# Patient Record
Sex: FEMALE | Race: BLACK OR AFRICAN AMERICAN | Employment: OTHER | URBAN - METROPOLITAN AREA
[De-identification: names, ages, dates, MRNs, and addresses within clinical notes are randomized per-mention and may not be internally consistent; named-entity substitution may affect disease eponyms.]

---

## 2023-06-07 ENCOUNTER — HOSPITAL ENCOUNTER (INPATIENT)
Age: 73
LOS: 1 days | Discharge: HOME | End: 2023-06-08
Attending: EMERGENCY MEDICINE | Admitting: INTERNAL MEDICINE
Payer: MEDICARE

## 2023-06-07 ENCOUNTER — APPOINTMENT (OUTPATIENT)
Dept: CT IMAGING | Age: 73
End: 2023-06-07
Payer: MEDICARE

## 2023-06-07 DIAGNOSIS — R55 SYNCOPE AND COLLAPSE: Primary | ICD-10-CM

## 2023-06-07 LAB
ANION GAP SERPL CALCULATED.3IONS-SCNC: 12 MMOL/L (ref 3–16)
BASOPHILS # BLD: 0 K/UL (ref 0–0.2)
BASOPHILS NFR BLD: 0.5 %
BUN SERPL-MCNC: 16 MG/DL (ref 7–20)
CALCIUM SERPL-MCNC: 9.3 MG/DL (ref 8.3–10.6)
CHLORIDE SERPL-SCNC: 104 MMOL/L (ref 99–110)
CO2 SERPL-SCNC: 23 MMOL/L (ref 21–32)
CREAT SERPL-MCNC: 1 MG/DL (ref 0.6–1.2)
DEPRECATED RDW RBC AUTO: 13.5 % (ref 12.4–15.4)
EOSINOPHIL # BLD: 0.3 K/UL (ref 0–0.6)
EOSINOPHIL NFR BLD: 3.6 %
GFR SERPLBLD CREATININE-BSD FMLA CKD-EPI: 59 ML/MIN/{1.73_M2}
GLUCOSE BLD-MCNC: 164 MG/DL (ref 70–99)
GLUCOSE SERPL-MCNC: 132 MG/DL (ref 70–99)
HCT VFR BLD AUTO: 36.5 % (ref 36–48)
HGB BLD-MCNC: 12.5 G/DL (ref 12–16)
LYMPHOCYTES # BLD: 3.6 K/UL (ref 1–5.1)
LYMPHOCYTES NFR BLD: 41.9 %
MCH RBC QN AUTO: 32.1 PG (ref 26–34)
MCHC RBC AUTO-ENTMCNC: 34.3 G/DL (ref 31–36)
MCV RBC AUTO: 93.7 FL (ref 80–100)
MONOCYTES # BLD: 0.5 K/UL (ref 0–1.3)
MONOCYTES NFR BLD: 6.3 %
NEUTROPHILS # BLD: 4.1 K/UL (ref 1.7–7.7)
NEUTROPHILS NFR BLD: 47.7 %
PERFORMED ON: ABNORMAL
PLATELET # BLD AUTO: 243 K/UL (ref 135–450)
PMV BLD AUTO: 8.7 FL (ref 5–10.5)
POTASSIUM SERPL-SCNC: 3.5 MMOL/L (ref 3.5–5.1)
RBC # BLD AUTO: 3.9 M/UL (ref 4–5.2)
SODIUM SERPL-SCNC: 139 MMOL/L (ref 136–145)
TROPONIN, HIGH SENSITIVITY: 9 NG/L (ref 0–14)
WBC # BLD AUTO: 8.6 K/UL (ref 4–11)

## 2023-06-07 PROCEDURE — 84484 ASSAY OF TROPONIN QUANT: CPT

## 2023-06-07 PROCEDURE — 99285 EMERGENCY DEPT VISIT HI MDM: CPT

## 2023-06-07 PROCEDURE — 70450 CT HEAD/BRAIN W/O DYE: CPT

## 2023-06-07 PROCEDURE — 93005 ELECTROCARDIOGRAM TRACING: CPT | Performed by: EMERGENCY MEDICINE

## 2023-06-07 PROCEDURE — 85025 COMPLETE CBC W/AUTO DIFF WBC: CPT

## 2023-06-07 PROCEDURE — 80048 BASIC METABOLIC PNL TOTAL CA: CPT

## 2023-06-07 RX ORDER — NICOTINE 21 MG/24HR
1 PATCH, TRANSDERMAL 24 HOURS TRANSDERMAL
COMMUNITY
Start: 2023-05-30

## 2023-06-07 RX ORDER — ASPIRIN 81 MG/1
81 TABLET ORAL DAILY
COMMUNITY
Start: 2015-10-13

## 2023-06-07 RX ORDER — ACETAMINOPHEN 325 MG/1
975 TABLET ORAL EVERY 8 HOURS PRN
COMMUNITY
Start: 2023-05-30

## 2023-06-07 RX ORDER — AMLODIPINE BESYLATE 10 MG/1
10 TABLET ORAL DAILY
COMMUNITY
Start: 2022-09-19

## 2023-06-07 RX ORDER — SIMVASTATIN 20 MG
20 TABLET ORAL NIGHTLY
COMMUNITY
Start: 2015-12-17

## 2023-06-07 RX ORDER — LISINOPRIL 10 MG/1
10 TABLET ORAL DAILY
COMMUNITY
Start: 2023-05-30 | End: 2023-06-07

## 2023-06-07 RX ORDER — SERTRALINE HYDROCHLORIDE 100 MG/1
TABLET, FILM COATED ORAL
COMMUNITY
Start: 2023-06-05

## 2023-06-07 RX ORDER — LOSARTAN POTASSIUM 50 MG/1
1 TABLET ORAL DAILY
Status: ON HOLD | COMMUNITY
Start: 2015-11-30 | End: 2023-06-08

## 2023-06-07 RX ORDER — QUETIAPINE FUMARATE 25 MG/1
25 TABLET, FILM COATED ORAL NIGHTLY
COMMUNITY
Start: 2023-06-05

## 2023-06-07 RX ORDER — LANOLIN ALCOHOL/MO/W.PET/CERES
6 CREAM (GRAM) TOPICAL NIGHTLY
COMMUNITY
Start: 2023-05-30

## 2023-06-07 RX ORDER — LEVETIRACETAM 500 MG/1
500 TABLET ORAL 2 TIMES DAILY
Status: ON HOLD | COMMUNITY
Start: 2022-09-19 | End: 2023-06-08 | Stop reason: HOSPADM

## 2023-06-07 ASSESSMENT — LIFESTYLE VARIABLES
HOW OFTEN DO YOU HAVE A DRINK CONTAINING ALCOHOL: NEVER
HOW MANY STANDARD DRINKS CONTAINING ALCOHOL DO YOU HAVE ON A TYPICAL DAY: PATIENT DOES NOT DRINK

## 2023-06-07 ASSESSMENT — PAIN SCALES - GENERAL: PAINLEVEL_OUTOF10: 0

## 2023-06-07 ASSESSMENT — PAIN - FUNCTIONAL ASSESSMENT: PAIN_FUNCTIONAL_ASSESSMENT: 0-10

## 2023-06-08 VITALS
HEIGHT: 67 IN | OXYGEN SATURATION: 93 % | SYSTOLIC BLOOD PRESSURE: 138 MMHG | HEART RATE: 56 BPM | WEIGHT: 152.5 LBS | BODY MASS INDEX: 23.93 KG/M2 | TEMPERATURE: 97.9 F | DIASTOLIC BLOOD PRESSURE: 63 MMHG | RESPIRATION RATE: 20 BRPM

## 2023-06-08 PROBLEM — R55 SYMPTOM OF SYNCOPE: Status: ACTIVE | Noted: 2023-06-08

## 2023-06-08 LAB
BILIRUB UR QL STRIP.AUTO: NEGATIVE
CLARITY UR: CLEAR
COLOR UR: YELLOW
EKG ATRIAL RATE: 58 BPM
EKG DIAGNOSIS: NORMAL
EKG P AXIS: 65 DEGREES
EKG P-R INTERVAL: 154 MS
EKG Q-T INTERVAL: 428 MS
EKG QRS DURATION: 80 MS
EKG QTC CALCULATION (BAZETT): 420 MS
EKG R AXIS: -3 DEGREES
EKG T AXIS: 79 DEGREES
EKG VENTRICULAR RATE: 58 BPM
GLUCOSE UR STRIP.AUTO-MCNC: NEGATIVE MG/DL
HGB UR QL STRIP.AUTO: NEGATIVE
KETONES UR STRIP.AUTO-MCNC: ABNORMAL MG/DL
LEUKOCYTE ESTERASE UR QL STRIP.AUTO: NEGATIVE
NITRITE UR QL STRIP.AUTO: NEGATIVE
PH UR STRIP.AUTO: 5.5 [PH] (ref 5–8)
PROT UR STRIP.AUTO-MCNC: NEGATIVE MG/DL
SP GR UR STRIP.AUTO: 1.02 (ref 1–1.03)
TROPONIN, HIGH SENSITIVITY: 10 NG/L (ref 0–14)
UA DIPSTICK W REFLEX MICRO PNL UR: ABNORMAL
URN SPEC COLLECT METH UR: ABNORMAL
UROBILINOGEN UR STRIP-ACNC: 2 E.U./DL

## 2023-06-08 PROCEDURE — 97161 PT EVAL LOW COMPLEX 20 MIN: CPT

## 2023-06-08 PROCEDURE — 81003 URINALYSIS AUTO W/O SCOPE: CPT

## 2023-06-08 PROCEDURE — 97530 THERAPEUTIC ACTIVITIES: CPT

## 2023-06-08 PROCEDURE — APPNB60 APP NON BILLABLE TIME 46-60 MINS: Performed by: NURSE PRACTITIONER

## 2023-06-08 PROCEDURE — 1200000000 HC SEMI PRIVATE

## 2023-06-08 PROCEDURE — 97165 OT EVAL LOW COMPLEX 30 MIN: CPT

## 2023-06-08 PROCEDURE — 2580000003 HC RX 258: Performed by: STUDENT IN AN ORGANIZED HEALTH CARE EDUCATION/TRAINING PROGRAM

## 2023-06-08 PROCEDURE — 97535 SELF CARE MNGMENT TRAINING: CPT

## 2023-06-08 PROCEDURE — 6370000000 HC RX 637 (ALT 250 FOR IP): Performed by: STUDENT IN AN ORGANIZED HEALTH CARE EDUCATION/TRAINING PROGRAM

## 2023-06-08 PROCEDURE — 97116 GAIT TRAINING THERAPY: CPT

## 2023-06-08 RX ORDER — LEVETIRACETAM 1000 MG/1
1000 TABLET ORAL 2 TIMES DAILY
Qty: 60 TABLET | Refills: 3 | Status: SHIPPED | OUTPATIENT
Start: 2023-06-08 | End: 2023-06-08 | Stop reason: SDUPTHER

## 2023-06-08 RX ORDER — ACETAMINOPHEN 650 MG/1
650 SUPPOSITORY RECTAL EVERY 6 HOURS PRN
Status: DISCONTINUED | OUTPATIENT
Start: 2023-06-08 | End: 2023-06-08 | Stop reason: HOSPADM

## 2023-06-08 RX ORDER — ENOXAPARIN SODIUM 100 MG/ML
40 INJECTION SUBCUTANEOUS DAILY
Status: DISCONTINUED | OUTPATIENT
Start: 2023-06-08 | End: 2023-06-08 | Stop reason: HOSPADM

## 2023-06-08 RX ORDER — NICOTINE 21 MG/24HR
1 PATCH, TRANSDERMAL 24 HOURS TRANSDERMAL DAILY
Status: DISCONTINUED | OUTPATIENT
Start: 2023-06-08 | End: 2023-06-08 | Stop reason: HOSPADM

## 2023-06-08 RX ORDER — LOSARTAN POTASSIUM 50 MG/1
50 TABLET ORAL DAILY
Status: DISCONTINUED | OUTPATIENT
Start: 2023-06-08 | End: 2023-06-08 | Stop reason: HOSPADM

## 2023-06-08 RX ORDER — LEVETIRACETAM 500 MG/1
1000 TABLET ORAL 2 TIMES DAILY
Status: DISCONTINUED | OUTPATIENT
Start: 2023-06-08 | End: 2023-06-08 | Stop reason: HOSPADM

## 2023-06-08 RX ORDER — ASPIRIN 81 MG/1
81 TABLET ORAL DAILY
Status: DISCONTINUED | OUTPATIENT
Start: 2023-06-08 | End: 2023-06-08 | Stop reason: HOSPADM

## 2023-06-08 RX ORDER — ONDANSETRON 4 MG/1
4 TABLET, ORALLY DISINTEGRATING ORAL EVERY 8 HOURS PRN
Status: DISCONTINUED | OUTPATIENT
Start: 2023-06-08 | End: 2023-06-08 | Stop reason: HOSPADM

## 2023-06-08 RX ORDER — LEVETIRACETAM 500 MG/1
500 TABLET ORAL 2 TIMES DAILY
Status: DISCONTINUED | OUTPATIENT
Start: 2023-06-08 | End: 2023-06-08

## 2023-06-08 RX ORDER — AMLODIPINE BESYLATE 10 MG/1
10 TABLET ORAL DAILY
Status: DISCONTINUED | OUTPATIENT
Start: 2023-06-08 | End: 2023-06-08 | Stop reason: HOSPADM

## 2023-06-08 RX ORDER — LANOLIN ALCOHOL/MO/W.PET/CERES
500 CREAM (GRAM) TOPICAL DAILY
Status: DISCONTINUED | OUTPATIENT
Start: 2023-06-08 | End: 2023-06-08 | Stop reason: HOSPADM

## 2023-06-08 RX ORDER — LISINOPRIL 10 MG/1
10 TABLET ORAL DAILY
COMMUNITY

## 2023-06-08 RX ORDER — SODIUM CHLORIDE 0.9 % (FLUSH) 0.9 %
5-40 SYRINGE (ML) INJECTION EVERY 12 HOURS SCHEDULED
Status: DISCONTINUED | OUTPATIENT
Start: 2023-06-08 | End: 2023-06-08 | Stop reason: HOSPADM

## 2023-06-08 RX ORDER — ACETAMINOPHEN 325 MG/1
650 TABLET ORAL EVERY 6 HOURS PRN
Status: DISCONTINUED | OUTPATIENT
Start: 2023-06-08 | End: 2023-06-08 | Stop reason: HOSPADM

## 2023-06-08 RX ORDER — ONDANSETRON 2 MG/ML
4 INJECTION INTRAMUSCULAR; INTRAVENOUS EVERY 6 HOURS PRN
Status: DISCONTINUED | OUTPATIENT
Start: 2023-06-08 | End: 2023-06-08 | Stop reason: HOSPADM

## 2023-06-08 RX ORDER — SODIUM CHLORIDE 9 MG/ML
INJECTION, SOLUTION INTRAVENOUS PRN
Status: DISCONTINUED | OUTPATIENT
Start: 2023-06-08 | End: 2023-06-08 | Stop reason: HOSPADM

## 2023-06-08 RX ORDER — ATORVASTATIN CALCIUM 10 MG/1
10 TABLET, FILM COATED ORAL DAILY
Status: DISCONTINUED | OUTPATIENT
Start: 2023-06-08 | End: 2023-06-08 | Stop reason: HOSPADM

## 2023-06-08 RX ORDER — POLYETHYLENE GLYCOL 3350 17 G/17G
17 POWDER, FOR SOLUTION ORAL DAILY PRN
Status: DISCONTINUED | OUTPATIENT
Start: 2023-06-08 | End: 2023-06-08 | Stop reason: HOSPADM

## 2023-06-08 RX ORDER — SODIUM CHLORIDE 0.9 % (FLUSH) 0.9 %
5-40 SYRINGE (ML) INJECTION PRN
Status: DISCONTINUED | OUTPATIENT
Start: 2023-06-08 | End: 2023-06-08 | Stop reason: HOSPADM

## 2023-06-08 RX ORDER — LEVETIRACETAM 1000 MG/1
1000 TABLET ORAL 2 TIMES DAILY
Qty: 60 TABLET | Refills: 3 | Status: SHIPPED | OUTPATIENT
Start: 2023-06-08

## 2023-06-08 RX ADMIN — ASPIRIN 81 MG: 81 TABLET, COATED ORAL at 09:56

## 2023-06-08 RX ADMIN — ATORVASTATIN CALCIUM 10 MG: 10 TABLET, FILM COATED ORAL at 09:56

## 2023-06-08 RX ADMIN — CYANOCOBALAMIN TAB 1000 MCG 500 MCG: 1000 TAB at 09:56

## 2023-06-08 RX ADMIN — SODIUM CHLORIDE, PRESERVATIVE FREE 10 ML: 5 INJECTION INTRAVENOUS at 09:56

## 2023-06-08 RX ADMIN — LEVETIRACETAM 500 MG: 500 TABLET, FILM COATED ORAL at 09:55

## 2023-06-08 NOTE — DISCHARGE SUMMARY
Conjunctiva/sclera: Conjunctivae normal.      Pupils: Pupils are equal, round, and reactive to light. Cardiovascular:      Rate and Rhythm: Normal rate and regular rhythm. Pulses: Normal pulses. Heart sounds: Normal heart sounds. Pulmonary:      Effort: Pulmonary effort is normal.      Breath sounds: Normal breath sounds. Abdominal:      General: Bowel sounds are normal.   Musculoskeletal:         General: Normal range of motion. Cervical back: Normal range of motion. Skin:     General: Skin is warm. Neurological:      General: No focal deficit present. Mental Status: She is alert. Comments: AOX1   Psychiatric:         Mood and Affect: Mood normal.         Behavior: Behavior normal.     Labs: For convenience and continuity at follow-up the following most recent labs are provided:      CBC:    Lab Results   Component Value Date/Time    WBC 8.6 06/07/2023 10:36 PM    HGB 12.5 06/07/2023 10:36 PM    HCT 36.5 06/07/2023 10:36 PM     06/07/2023 10:36 PM       Renal:    Lab Results   Component Value Date/Time     06/07/2023 10:36 PM    K 3.5 06/07/2023 10:36 PM     06/07/2023 10:36 PM    CO2 23 06/07/2023 10:36 PM    BUN 16 06/07/2023 10:36 PM    CREATININE 1.0 06/07/2023 10:36 PM    CALCIUM 9.3 06/07/2023 10:36 PM       Significant Diagnostic Studies    Radiology:   CT HEAD WO CONTRAST   Final Result   Impression:   1. No evidence of recent intracranial hemorrhage. 2. Parenchymal volume loss and chronic small vessel ischemic changes in the white matter. 3. Remote lacunar infarcts as above.            Consults:     IP CONSULT TO PHARMACY  IP CONSULT TO SOCIAL WORK  IP CONSULT TO NEUROLOGY  IP CONSULT TO HOME CARE NEEDS    Disposition:  Home yandel Cavazos     Condition at Discharge: Stable    Discharge Instructions/Follow-up:  PCP, neurology    Code Status:  Full Code     Activity: activity as tolerated    Diet: regular diet      Discharge Medications:     Current Discharge

## 2023-06-08 NOTE — H&P
History & Physical      CC syncopal episode    History Obtained From:  family member -patient's daughter    HISTORY OF PRESENT ILLNESS:  68-year-old female with past medical history of advanced dementia, hypertension, seizure disorder (on AED) presented to the ED following a syncopal episode. At baseline patient is AO x1, oriented to self. Per patient's daughter, patient had just had dinner and was sitting on the chair when she dozed off and fell down. Patient hit her head on the floor which woke her up. This was a witnessed fall, no seizure activity/generalized shaking, tongue biting, eye rolling was noted. Before dozing off, patient had told the daughter that she was feeling sleepy and she will take a nap on the chair, unfortunately the chair tipped over and patient hit her head. Patient's daughter reports patient had just taken her Keppra medication 30 minutes prior to this following episode. She is compliant with all her medications. Denies any prior chest pain, shortness of breath, nausea/vomiting, lightheadedness or dizziness. EMS was called who checked in on the patient reported she was fine but patient's daughter wanted to get patient's head scan and requested them to take her to the hospital for the same. Patient was hemodynamically stable. In the ED, afebrile 98 0.1 F, RR 16, pulse 58, /69, SPO2 91% on room air. Labs not significant. CT head Noncon nonrevealing of any recent intracranial hemorrhage. Parenchymal volume loss and chronic small vessel ischemic changes in white matter. Remote lacunar infarct. Patient was admitted for further work-up of syncope. Past Medical History:        Diagnosis Date    Depression     Hypertension        Past Surgical History:    History reviewed. No pertinent surgical history.     Medications Priorto Admission:    Medications Prior to Admission: simvastatin (ZOCOR) 20 MG tablet, Take 1 tablet by mouth nightly  QUEtiapine (SEROQUEL) 25 MG

## 2023-06-08 NOTE — CARE COORDINATION
CTN contacted [facility] [phone]. They have accepted this patient and will pull referral from [facility].  They will contact patient and make arrangements for [facility] by 6/10  Electronically signed by Ray Barrios LPN on 7/8/6243 at 2:76 PM
/24      DISCHARGE Disposition: Home with Home Health Care: SN PTOT      LOC at discharge: Skilled  NICK Completed: Not Indicated    Notification completed in HENS/PAS?:  Not Applicable    IMM Completed:   No         Transportation:  Transportation PLAN for discharge: family   Mode of Transport: Private Car  Reason for medical transport: Not Applicable  Name of [address]: Not Applicable  Time of Transport: when  dg  Tanmay Morgan here    Transport form completed: No    Home Care:  [address] ordered at discharge: Yes  [address]:     [facility] [address] [place] [address] online care: [facility]. [facility]   [address], [address]  Open ? Closes 5? PM  Phone: [phone]    Orders faxed: Yes    Durable Medical Equipment:  DME Provider: NA  Equipment obtained during hospitalization: NA    Home Oxygen and Respiratory Equipment:  Oxygen needed at discharge?: No  [address]: Not Applicable  Portable tank available for discharge?: No    Dialysis:  Dialysis patient: No    Dialysis Center:  Not Applicable    Hospice Services:  Location: Not Applicable  Agency: Not Applicable    Consents signed: Not Indicated    Referrals made at [facility] for outpatient continued care:  Not Applicable    Additional CM Notes:     CM  confirmed  d/c home today      Patient  lives  with Dg  [employer]  who  will transport. Patient to follow p out pt  as  instructed:     Agreeable to  [facility]  and  stated no preference or  choice. [facility]  accepted  . New Rx:  Meds to Beds  utilized      these medications from [facility] [address] [facility], [address] [place] [address]  Vincenzo Gutierrez [phone] Jennifer Lion [phone]  levETIRAcetam      Schedule an appointment with Sonya Lovell - Neurology as soon as possible for a visit in 1 month(s)  [address]   [phone]      COVID Result:  No results found for: COVID19    The Plan for Transition of Care is
cont to  follow . The Plan for Transition of Care is related to the following treatment goals of Syncope and collapse [R55]  Symptom of syncope [S88]    IF APPLICABLE: The Patient and/or patient representative Digna and her family were provided with a choice of provider and agrees with the discharge plan. Freedom of choice list with basic dialogue that supports the patient's individualized plan of care/goals and shares the quality data associated with the providers was provided to: Patient Representative   Patient Representative Name: anna Brock     The Patient and/or Patient Representative Agree with the Discharge Plan?  Yes    Marly Washburn RN  Case Management Department  Ph: [phone]

## 2023-06-08 NOTE — PLAN OF CARE
Problem: Safety - Adult  Goal: Free from fall injury  Outcome: Progressing:  Patient remains free from falls. Problem: Pain  Goal: Verbalizes/displays adequate comfort level or baseline comfort level  Outcome: Progressing:  Patient denies pain/needs at this time. Resting in bed, eyes closed. Patient refused all medications/care upon admit. Bed in low position, call light in reach, bed alarm and camera monitoring on for patient for safety, will continue to monitor.

## 2023-06-08 NOTE — PLAN OF CARE
Problem: Discharge Planning  Goal: Discharge to home or other facility with appropriate resources  Outcome: Progressing     Problem: Pain  Goal: Verbalizes/displays adequate comfort level or baseline comfort level  6/8/2023 1406 by Pradip Brock RN  Outcome: Progressing     Problem: Safety - Medical Restraint  Goal: Remains free of injury from restraints (Restraint for Interference with Medical Device)  Description: INTERVENTIONS:  1. Determine that other, less restrictive measures have been tried or would not be effective before applying the restraint  2. Evaluate the patient's condition at the time of restraint application  3. Inform patient/family regarding the reason for restraint  4.  Q2H: Monitor safety, psychosocial status, comfort, nutrition and hydration  Outcome: Adequate for Discharge     Problem: Safety - Adult  Goal: Free from fall injury  6/8/2023 1406 by Pradip Brock RN  Outcome: Adequate for Discharge

## 2023-06-08 NOTE — PROGRESS NOTES
4 Eyes in 4 Hours:  Unable to perform, due to patient's refusal to receive care. Telemetry Monitoring:  Patient is refusing to have telemetry monitoring.
Occupational Therapy  Facility/Department: [facility] 6 [address] [facility] Initial Assessment /Treatment/Discharge     Name: Marco A Flores  : 1950  MRN: [id number]  Date of Service: 2023    Discharge Recommendations:   Marco A Flores scored a 21/24 on the AM-PAC ADL Inpatient form. Current research shows that an AM-PAC score of 18 or greater is typically associated with a discharge to the patient's home setting. Please see assessment section for further patient specific details. OT Equipment Recommendations  Equipment Needed: No       Patient Diagnosis(es): The encounter diagnosis was Syncope and collapse. Past Medical History:  has a past medical history of Depression and Hypertension. Past Surgical History:  has no past surgical history on file. Assessment   Assessment: Pt presents from home after a fall from a chair. Pt is pleasant and cooperative. Pt req SBA to supervison for ADL and functional mobility. Pt is likely at her baseline. No acute OT needs identified. Prognosis: Good  Decision Making: Low Complexity  REQUIRES OT FOLLOW-UP: No  Activity Tolerance  Activity Tolerance: Patient Tolerated treatment well        Plan   Occupational Therapy Plan  Additional Comments: Discharge pt from acute OT     Restrictions  Position Activity Restriction  Other position/activity restrictions: Up with assist, up as tolerated. Seizure precautions. Subjective   General  Chart Reviewed: Yes  Additional Pertinent Hx: Pt admitted 23 for syncopal episode. Per patient's daughter, patient had just had dinner and was sitting on the chair when she dozed off and fell down. Patient hit her head on the floor which woke her up. CT head= No evidence of recent intracranial hemorrhage. 2.Parenchymal volume loss and chronic small vessel ischemic changes in the white matter. 3.Remote lacunar infarcts as above.   Family / Caregiver Present: No  Referring Practitioner: 
Patient arrived to [address] [place] [address] from the ER and she was immediately uncooperative with care stating she wanted to get in her [facility] and go home (asking staff where her car was). She refused lab work when phlebotomy came to see her. Physician, Dr. Merna Caruso, aware of patient's inability to cooperate with care as she came to see patient, along with Charge RN, Florinda Prado during her arrival to the floor. While helping patient to bed and applying a warm blanket for comfort, she hit me with her hand. Notified Florinda Prado, Charge RN at the , along with [facility], Daria Oconnor, who stated she saw patient hit me on the camera as she was watching the camera and monitor. Presently, patient is resting in bed. Bed in low position, call light in reach, bed alarm and camera on for patient's safety, will continue to monitor.
Per nurse report patient family informed that she had a seizure when she fell. I give a call to the patient daughter Orville Cristina. Rashmi Ellis described that the patient was in her chair sitting when she fall asleep and fell backwards. Patient hit her head with the floor. Right after the patient hit her head patient family who was there reported that the patient started having tonic-clonic movements and she was not responsive for less than 1 minute. Patient family is not sure if the patient had loss of sphincter control. Patient family denies tongue biting. They report that when the tonic movements ended patient was back to baseline, alert and oriented to herself and able to have a conversation. Patient family expresses concerns of seizure episode. With this new information we will place a consult for neurology to see the patient.      Electronically signed by Radha Gibbs MD on 6/8/23 at 11:59 AM EDT
Physical Therapy  Facility/Department: [address] [place] [address]  Physical Therapy Initial Assessment    Name: Maximus Polo  : 1950  MRN: [id number]  Date of Service: 2023    Discharge Recommendations: Maximus Polo scored a 19/24 on the AM-PAC short mobility form. Current research shows that an AM-PAC score of 18 or greater is typically associated with a discharge to the patient's home setting. Please see assessment section for further patient specific details. If patient discharges prior to next session this note will serve as a discharge summary. Please see below for the latest assessment towards goals. PT Equipment Recommendations  Equipment Needed: No      Patient Diagnosis(es): The encounter diagnosis was Syncope and collapse. Past Medical History:  has a past medical history of Depression and Hypertension. Past Surgical History:  has no past surgical history on file. Assessment   Body Structures, Functions, Activity Limitations Requiring Skilled Therapeutic Intervention: Decreased functional mobility ; Decreased safe awareness;Decreased balance;Decreased cognition  Assessment: pt is a 75 yo female from home with family reportedly IND at baseline however has hx of dementia and is a poor historian. pt pleasantly confused on eval. pt presents close to baseline function physically performing bed mobility, transfers, and ambulation with no AD and SBA. pt with good endurance and balance. pt expressing no decline from her baseline and plans to return home at [place]. pt would benefit from 24hr 2/2 cognitive deficits.  PT to f/u  Treatment Diagnosis: impaired functional mobility 2/2 cognition  Therapy Prognosis: Good  Decision Making: Low Complexity  Requires PT Follow-Up: Yes  Activity Tolerance  Activity Tolerance: Patient tolerated evaluation without incident     Plan   Physcial Therapy Plan  General Plan:  (2-5)  Current Treatment Recommendations: Strengthening, Balance training, Functional
Pt discharging home with daughter. Pt's Iv removed earlier and pt not on telemetry. Pt leaving with new prescription of keppra. Pt left with all personal belongings.
and during discharge there was a disagreement between the daughters at that time as to who will take mother home, and case was escalated with social work. - Consult to help determine appropriate discharge planning.       Code Status: Full code  FEN: Regular  PPX: Lovenox  DISPO: Robyn Soulier MS4  Miguel Michelle MD, PGY-1    This patient has been staffed and discussed with Duy Segovia MD.

## 2023-06-08 NOTE — ED NOTES
Score: 1  Level of Consciousness: Alert (0)   Vitals:    06/07/23 2210 06/08/23 0032   BP: 118/69 121/71   Pulse: 58 64   Resp: 16 23   Temp: 98.1 °F (36.7 °C)    TempSrc: Oral    SpO2: 91% 91%   Weight: 152 lb 8 oz (69.2 kg)    Height: 5' 6.5\" (1.689 m)      FiO2 (%):   O2 Flow Rate: O2 Device: None (Room air)    Cardiac Rhythm:    Pain Assessment:  [x] Verbal [] William Moan Scale  Pain Scale: Pain Assessment  Pain Assessment: 0-10  Pain Level: 0  Last documented pain score (0-10 scale) Pain Level: 0  Last documented pain medication administered:   Mental Status: disoriented  Orientation Level:    NIH Score:    C-SSRS: Risk of Suicide: No Risk  Bedside swallow:    Borden Coma Scale (GCS): Borden Coma Scale  Eye Opening: Spontaneous  Best Verbal Response: Oriented  Best Motor Response: Obeys commands  Borden Coma Scale Score: 15  Active LDA's:   Peripheral IV 06/08/23 Left;Posterior Hand (Active)   Site Assessment Clean, dry & intact 06/08/23 0139   Line Status Blood return noted 06/08/23 0139   Dressing Status New dressing applied 06/08/23 0139     PO Status: Regular  Pertinent or High Risk Medications/Drips: no   If Yes, please provide details:   Pending Blood Product Administration: no       You may also review the ED PT Care Timeline found under the Summary Nursing Index tab. Recommendation    Pending orders None  Plan for Discharge (if known): Additional Comments: Pt is ambulatory but confused, kept on roaming the lowery and wants to go home.   If any further questions, please call Sending RN at [phone]    Electronically signed by: Electronically signed by Rob Bourgeois RN on 6/8/2023 at 1:58 AM      Rob Bourgeois RN  06/08/23 7364

## 2023-06-08 NOTE — CONSULTS
Clinical Pharmacy Progress Note  Medication History      Asked to verify home medications by Dr. Alexandra Solis. List of of current medications patient is taking is complete. Home Medication list in EPIC updated to reflect changes noted below. Source of information: RN interview with pt / family  in ED, pharmacy fills via [employer], recent discharge summary from [facility]     Changes made to home medication list:   Medications removed (no longer taking):  losartan     Medications added:   Lisinopril 10mg po daily     Medication doses / instructions adjusted:   none     Please call with questions--  Thanks--  Roni Guerrero, PharmD, BCPS, BCGP  [id number] (\[facility]\"")   6/8/2023 8:25 AM      Current Outpatient Medications   Medication Instructions    acetaminophen (TYLENOL) 975 mg, Oral, EVERY 8 HOURS PRN    amLODIPine (NORVASC) 10 mg, Oral, DAILY    aspirin 81 mg, Oral, DAILY    cyanocobalamin 500 mcg, Oral, DAILY    levETIRAcetam (KEPPRA) 500 mg, Oral, 2 TIMES DAILY    lisinopril (PRINIVIL;ZESTRIL) 10 mg, Oral, DAILY    melatonin 6 mg, Oral, NIGHTLY    nicotine (NICODERM CQ) 21 MG/24HR 1 patch, TransDERmal    QUEtiapine (SEROQUEL) 25 mg, Oral, NIGHTLY    sertraline (ZOLOFT) 100 MG tablet No dose, route, or frequency recorded.     simvastatin (ZOCOR) 20 mg, Oral, NIGHTLY
Daily    levETIRAcetam, 500 mg, Oral, BID    [Held by provider] losartan, 50 mg, Oral, Daily    nicotine, 1 patch, TransDERmal, Daily    atorvastatin, 10 mg, Oral, Daily    sodium chloride flush, 5-40 mL, IntraVENous, 2 times per day    enoxaparin, 40 mg, SubCUTAneous, Daily   Infusions    sodium chloride          IMPRESSION & RECOMMENDATIONS     IMPRESSION:  Ms. Mikaela Beard is a 75 y/o woman with a history of dementia and seizures who presents with breakthrough seizure in the setting of recent UTI. She appears back to baseline. RECOMMENDATIONS:  - Increase Keppra to 1g BID  - No driving 3 months seizure free. Given her memory issues this MUST be reiterated by primary team TO FAMILY prior to discharge.  I attempted to call both daughters and my calls were not answered  - F/U with neurology here or will need to establish with neurology in [place]  - She would benefit from formal neuropsychiatric testing and placement in nursing facility     Management and plan discussed with:   Dr. Lexi Martin, APRN - CNP   Neurology & Neurocritical Care   6/8/2023 1:00 PM    Floor / PCU Patients:  [employer]: [facility]

## 2023-06-08 NOTE — DISCHARGE INSTR - COC
Catheter: {Urinary Catheter:072208815}   Colostomy/Ileostomy/Ileal Conduit: {YES / IX:71698}       Date of Last BM: ***    Intake/Output Summary (Last 24 hours) at 2023 1532  Last data filed at 2023 1354  Gross per 24 hour   Intake 360 ml   Output --   Net 360 ml     No intake/output data recorded. Safety Concerns:     [facility] Safety Concerns:813437098}    Impairments/Disabilities:      [facility] Impairments/Disabilities:538704535}    Nutrition Therapy:  Current Nutrition Therapy:   [facility] Diet List:218410629}    Routes of Feeding: {CHP DME Other Feedings:457517512}  Liquids: {Slp liquid thickness:50417}  Daily Fluid Restriction: {CHP DME Yes amt example:267260476}  Last Modified Barium Swallow with Video (Video Swallowing Test): {Done Not Done WLNM:971334685}    Treatments at the Time of Hospital Discharge:   Respiratory Treatments: ***  Oxygen Therapy:  {Therapy; copd oxygen:22783}  Ventilator:    { CC Vent SIJU:928368755}    Rehab Therapies: {THERAPEUTIC INTERVENTION:0141115411}  Weight Bearing Status/Restrictions: [facility] Weight Bearin}  Other Medical Equipment (for information only, NOT a DME order):  {EQUIPMENT:134008541}  Other Treatments: ***    Patient's personal belongings (please select all that are sent with patient):  {CHP DME Belongings:880706645}    RN SIGNATURE:  {Esignature:732930329}    CASE MANAGEMENT/SOCIAL WORK SECTION    Inpatient Status Date: 2023    Readmission Risk Assessment Score:  Readmission Risk              Risk of Unplanned Readmission:  12           Discharging to Facility/ Agency       Stay [address] online care: [facility]. [employer]   [address], [place], [address]  Open ? Closes 5? PM  Phone: [phone]      Dialysis Facility (if applicable) NA  Name:  Address:  Dialysis Schedule:  Phone:  Fax:    / signature: Electronically signed by Franki Marshall RN on 23 at 3:33 PM

## 2023-06-08 NOTE — DISCHARGE INSTRUCTIONS
-Please follow up with your PCP in a week  -Please follow up with Neurology here or please establish care with Neurology if plan to move to [place]  Seizure Driving Risk: Having a Seizure while driving puts you at risk for injuring yourself or others. If you drive while having uncontrolled seizures, you may be held liable for injuries to others. Do not drive until you have been seizure free for at least 3 months, state laws vary so please check laws in your state. Injury Risk: Please avoid working from [employer], swimming alone or taking baths in a bathtub, use showers only.

## 2023-06-08 NOTE — ED PROVIDER NOTES
Value Ref Range    Color, UA Yellow Straw/Yellow    Clarity, UA Clear Clear    Glucose, Ur Negative Negative mg/dL    Bilirubin Urine Negative Negative    Ketones, Urine TRACE (A) Negative mg/dL    Specific Gravity, UA 1.025 1.005 - 1.030    Blood, Urine Negative Negative    pH, UA 5.5 5.0 - 8.0    Protein, UA Negative Negative mg/dL    Urobilinogen, Urine 2.0 (A) <2.0 E.U./dL    Nitrite, Urine Negative Negative    Leukocyte Esterase, Urine Negative Negative    Microscopic Examination Not Indicated     Urine Type Voided    Troponin   Result Value Ref Range    Troponin, High Sensitivity 10 0 - 14 ng/L   POCT Glucose   Result Value Ref Range    POC Glucose 164 (H) 70 - 99 mg/dl    Performed on ACCU-TyrogenexK      EKG   EKG as interpreted by me shows the patient to be in a sinus bradycardic rhythm with a rate of 58, normal axis, normal TN and QT intervals, normal QRS duration, no ST segment abnormalities, T wave flattening in the lateral leads. ED BEDSIDE ULTRASOUND:  No results found. MOST RECENT VITALS:  BP: 118/69,Temp: 98.1 °F (36.7 °C), Pulse: 58, Respirations: 16, SpO2: 91 %     Procedures     N/A    ED Course     Nursing Notes, Past Medical Hx, Past Surgical Hx, Social Hx,Allergies, and Family Hx were reviewed. The patient was given the following medications:  No orders of the defined types were placed in this encounter. CONSULTS:  None    Review of Systems     Review of Systems   Unable to perform ROS: Dementia     Past Medical, Surgical, Family, and Social History     She has a past medical history of Depression and Hypertension. She has no past surgical history on file. Her family history is not on file. She reports that she has been smoking cigarettes. She has a 7.50 pack-year smoking history. She has never used smokeless tobacco. She reports current drug use. Drug: Marijuana [employer] Cashing).     Medications     Previous Medications    ACETAMINOPHEN (TYLENOL) 325 MG TABLET    Take 3 tablets by mouth

## 2023-07-31 RX ORDER — LEVETIRACETAM 1000 MG/1
TABLET ORAL
Qty: 60 TABLET | Refills: 3 | OUTPATIENT
Start: 2023-07-31

## 2024-02-15 ENCOUNTER — HOSPITAL ENCOUNTER (EMERGENCY)
Age: 74
Discharge: HOME | End: 2024-02-15

## 2024-02-15 VITALS
SYSTOLIC BLOOD PRESSURE: 150 MMHG | BODY MASS INDEX: 24.25 KG/M2 | HEART RATE: 57 BPM | RESPIRATION RATE: 18 BRPM | OXYGEN SATURATION: 100 % | DIASTOLIC BLOOD PRESSURE: 74 MMHG | HEIGHT: 67 IN | TEMPERATURE: 97.8 F

## 2024-02-15 NOTE — ED TRIAGE NOTES
Pt brought in by daughter dt concern for UTI. Daughter reports pt has been confused and hallucinating, PD found pt [facility] of a [facility] and pt stated she was looking for her brother. Daughter states pt has hx UTIs and that this is how pt normally acts when she has one. Hx of dementia. Pt denies pain or urinary frequency

## 2024-05-17 ENCOUNTER — OFFICE VISIT (OUTPATIENT)
Dept: ORTHOPEDIC SURGERY | Age: 74
End: 2024-05-17
Payer: MEDICARE

## 2024-05-17 DIAGNOSIS — M25.512 LEFT SHOULDER PAIN, UNSPECIFIED CHRONICITY: Primary | ICD-10-CM

## 2024-05-17 DIAGNOSIS — S46.912A STRAIN OF LEFT SHOULDER, INITIAL ENCOUNTER: ICD-10-CM

## 2024-05-17 PROCEDURE — 20610 DRAIN/INJ JOINT/BURSA W/O US: CPT | Performed by: PHYSICIAN ASSISTANT

## 2024-05-17 PROCEDURE — 99203 OFFICE O/P NEW LOW 30 MIN: CPT | Performed by: PHYSICIAN ASSISTANT

## 2024-05-17 PROCEDURE — 1123F ACP DISCUSS/DSCN MKR DOCD: CPT | Performed by: PHYSICIAN ASSISTANT

## 2024-05-17 RX ORDER — LIDOCAINE HYDROCHLORIDE 10 MG/ML
5 INJECTION, SOLUTION INFILTRATION; PERINEURAL ONCE
Status: COMPLETED | OUTPATIENT
Start: 2024-05-17 | End: 2024-05-17

## 2024-05-17 RX ORDER — BUPIVACAINE HYDROCHLORIDE 2.5 MG/ML
30 INJECTION, SOLUTION INFILTRATION; PERINEURAL ONCE
Status: COMPLETED | OUTPATIENT
Start: 2024-05-17 | End: 2024-05-17

## 2024-05-17 RX ORDER — TRIAMCINOLONE ACETONIDE 40 MG/ML
40 INJECTION, SUSPENSION INTRA-ARTICULAR; INTRAMUSCULAR ONCE
Status: COMPLETED | OUTPATIENT
Start: 2024-05-17 | End: 2024-05-17

## 2024-05-17 RX ADMIN — TRIAMCINOLONE ACETONIDE 40 MG: 40 INJECTION, SUSPENSION INTRA-ARTICULAR; INTRAMUSCULAR at 09:54

## 2024-05-17 RX ADMIN — BUPIVACAINE HYDROCHLORIDE 75 MG: 2.5 INJECTION, SOLUTION INFILTRATION; PERINEURAL at 09:52

## 2024-05-17 RX ADMIN — LIDOCAINE HYDROCHLORIDE 5 ML: 10 INJECTION, SOLUTION INFILTRATION; PERINEURAL at 09:53
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
## 2024-05-26 ENCOUNTER — HOSPITAL ENCOUNTER (INPATIENT)
Age: 74
LOS: 3 days | Discharge: NURSING, REHAB OR LONG-TERM CARE | DRG: 690 | End: 2024-05-29
Attending: INTERNAL MEDICINE | Admitting: INTERNAL MEDICINE
Payer: MEDICARE

## 2024-05-26 ENCOUNTER — APPOINTMENT (OUTPATIENT)
Dept: CT IMAGING | Age: 74
DRG: 690 | End: 2024-05-26
Payer: MEDICARE

## 2024-05-26 ENCOUNTER — APPOINTMENT (OUTPATIENT)
Dept: GENERAL RADIOLOGY | Age: 74
DRG: 690 | End: 2024-05-26
Payer: MEDICARE

## 2024-05-26 DIAGNOSIS — R41.82 ALTERED MENTAL STATUS, UNSPECIFIED ALTERED MENTAL STATUS TYPE: Primary | ICD-10-CM

## 2024-05-26 DIAGNOSIS — N30.00 ACUTE CYSTITIS WITHOUT HEMATURIA: ICD-10-CM

## 2024-05-26 PROBLEM — N39.0 UTI (URINARY TRACT INFECTION): Status: ACTIVE | Noted: 2024-05-26

## 2024-05-26 PROBLEM — I10 HTN (HYPERTENSION): Status: ACTIVE | Noted: 2024-05-26

## 2024-05-26 PROBLEM — E78.00 HIGH CHOLESTEROL: Status: ACTIVE | Noted: 2024-05-26

## 2024-05-26 PROBLEM — N39.0 COMPLICATED UTI (URINARY TRACT INFECTION): Status: ACTIVE | Noted: 2024-05-26

## 2024-05-26 PROBLEM — F03.90 DEMENTIA (HCC): Status: ACTIVE | Noted: 2024-05-26

## 2024-05-26 LAB
ALBUMIN SERPL-MCNC: 4 G/DL (ref 3.4–5)
ALBUMIN/GLOB SERPL: 1.2 {RATIO} (ref 1.1–2.2)
ALP SERPL-CCNC: 79 U/L (ref 40–129)
ALT SERPL-CCNC: 16 U/L (ref 10–40)
AMPHETAMINES UR QL SCN>1000 NG/ML: NORMAL
ANION GAP SERPL CALCULATED.3IONS-SCNC: 10 MMOL/L (ref 3–16)
APAP SERPL-MCNC: <5 UG/ML (ref 10–30)
AST SERPL-CCNC: 26 U/L (ref 15–37)
BACTERIA URNS QL MICRO: ABNORMAL /HPF
BARBITURATES UR QL SCN>200 NG/ML: NORMAL
BASOPHILS # BLD: 0.1 K/UL (ref 0–0.2)
BASOPHILS NFR BLD: 0.6 %
BENZODIAZ UR QL SCN>200 NG/ML: NORMAL
BILIRUB SERPL-MCNC: <0.2 MG/DL (ref 0–1)
BILIRUB UR QL STRIP.AUTO: NEGATIVE
BUN SERPL-MCNC: 16 MG/DL (ref 7–20)
CALCIUM SERPL-MCNC: 9.5 MG/DL (ref 8.3–10.6)
CANNABINOIDS UR QL SCN>50 NG/ML: NORMAL
CHLORIDE SERPL-SCNC: 105 MMOL/L (ref 99–110)
CLARITY UR: CLEAR
CO2 SERPL-SCNC: 23 MMOL/L (ref 21–32)
COCAINE UR QL SCN: NORMAL
COLOR UR: YELLOW
CREAT SERPL-MCNC: 0.8 MG/DL (ref 0.6–1.2)
DEPRECATED RDW RBC AUTO: 14.2 % (ref 12.4–15.4)
DRUG SCREEN COMMENT UR-IMP: NORMAL
EOSINOPHIL # BLD: 0.2 K/UL (ref 0–0.6)
EOSINOPHIL NFR BLD: 1.8 %
EPI CELLS #/AREA URNS AUTO: 1 /HPF (ref 0–5)
ETHANOLAMINE SERPL-MCNC: NORMAL MG/DL (ref 0–0.08)
FENTANYL SCREEN, URINE: NORMAL
GFR SERPLBLD CREATININE-BSD FMLA CKD-EPI: 76 ML/MIN/{1.73_M2}
GLUCOSE SERPL-MCNC: 91 MG/DL (ref 70–99)
GLUCOSE UR STRIP.AUTO-MCNC: NEGATIVE MG/DL
HCT VFR BLD AUTO: 38.4 % (ref 36–48)
HGB BLD-MCNC: 12.6 G/DL (ref 12–16)
HGB UR QL STRIP.AUTO: NEGATIVE
HYALINE CASTS #/AREA URNS AUTO: 0 /LPF (ref 0–8)
KETONES UR STRIP.AUTO-MCNC: ABNORMAL MG/DL
LEUKOCYTE ESTERASE UR QL STRIP.AUTO: ABNORMAL
LYMPHOCYTES # BLD: 2.2 K/UL (ref 1–5.1)
LYMPHOCYTES NFR BLD: 21.7 %
MCH RBC QN AUTO: 30.1 PG (ref 26–34)
MCHC RBC AUTO-ENTMCNC: 32.9 G/DL (ref 31–36)
MCV RBC AUTO: 91.7 FL (ref 80–100)
METHADONE UR QL SCN>300 NG/ML: NORMAL
MONOCYTES # BLD: 0.6 K/UL (ref 0–1.3)
MONOCYTES NFR BLD: 5.7 %
NEUTROPHILS # BLD: 7.2 K/UL (ref 1.7–7.7)
NEUTROPHILS NFR BLD: 70.2 %
NITRITE UR QL STRIP.AUTO: POSITIVE
OPIATES UR QL SCN>300 NG/ML: NORMAL
OXYCODONE UR QL SCN: NORMAL
PCP UR QL SCN>25 NG/ML: NORMAL
PH UR STRIP.AUTO: 6.5 [PH] (ref 5–8)
PH UR STRIP: 6 [PH]
PLATELET # BLD AUTO: 271 K/UL (ref 135–450)
PMV BLD AUTO: 8.2 FL (ref 5–10.5)
POTASSIUM SERPL-SCNC: 5.1 MMOL/L (ref 3.5–5.1)
PROT SERPL-MCNC: 7.3 G/DL (ref 6.4–8.2)
PROT UR STRIP.AUTO-MCNC: NEGATIVE MG/DL
RBC # BLD AUTO: 4.19 M/UL (ref 4–5.2)
RBC CLUMPS #/AREA URNS AUTO: 1 /HPF (ref 0–4)
SALICYLATES SERPL-MCNC: <0.3 MG/DL (ref 15–30)
SODIUM SERPL-SCNC: 138 MMOL/L (ref 136–145)
SP GR UR STRIP.AUTO: 1.02 (ref 1–1.03)
UA COMPLETE W REFLEX CULTURE PNL UR: ABNORMAL
UA DIPSTICK W REFLEX MICRO PNL UR: YES
URN SPEC COLLECT METH UR: ABNORMAL
UROBILINOGEN UR STRIP-ACNC: 1 E.U./DL
WBC # BLD AUTO: 10.3 K/UL (ref 4–11)
WBC #/AREA URNS AUTO: 4 /HPF (ref 0–5)

## 2024-05-26 PROCEDURE — 2580000003 HC RX 258: Performed by: PHYSICIAN ASSISTANT

## 2024-05-26 PROCEDURE — 87086 URINE CULTURE/COLONY COUNT: CPT

## 2024-05-26 PROCEDURE — 85025 COMPLETE CBC W/AUTO DIFF WBC: CPT

## 2024-05-26 PROCEDURE — 80143 DRUG ASSAY ACETAMINOPHEN: CPT

## 2024-05-26 PROCEDURE — 6370000000 HC RX 637 (ALT 250 FOR IP): Performed by: INTERNAL MEDICINE

## 2024-05-26 PROCEDURE — 1200000000 HC SEMI PRIVATE

## 2024-05-26 PROCEDURE — 96374 THER/PROPH/DIAG INJ IV PUSH: CPT

## 2024-05-26 PROCEDURE — 99285 EMERGENCY DEPT VISIT HI MDM: CPT

## 2024-05-26 PROCEDURE — 80179 DRUG ASSAY SALICYLATE: CPT

## 2024-05-26 PROCEDURE — 80053 COMPREHEN METABOLIC PANEL: CPT

## 2024-05-26 PROCEDURE — 96375 TX/PRO/DX INJ NEW DRUG ADDON: CPT

## 2024-05-26 PROCEDURE — A4216 STERILE WATER/SALINE, 10 ML: HCPCS | Performed by: PHYSICIAN ASSISTANT

## 2024-05-26 PROCEDURE — 87186 SC STD MICRODIL/AGAR DIL: CPT

## 2024-05-26 PROCEDURE — 2580000003 HC RX 258: Performed by: INTERNAL MEDICINE

## 2024-05-26 PROCEDURE — 36415 COLL VENOUS BLD VENIPUNCTURE: CPT

## 2024-05-26 PROCEDURE — 82077 ASSAY SPEC XCP UR&BREATH IA: CPT

## 2024-05-26 PROCEDURE — 71045 X-RAY EXAM CHEST 1 VIEW: CPT

## 2024-05-26 PROCEDURE — 6360000002 HC RX W HCPCS: Performed by: PHYSICIAN ASSISTANT

## 2024-05-26 PROCEDURE — 81001 URINALYSIS AUTO W/SCOPE: CPT

## 2024-05-26 PROCEDURE — 80307 DRUG TEST PRSMV CHEM ANLYZR: CPT

## 2024-05-26 PROCEDURE — 93005 ELECTROCARDIOGRAM TRACING: CPT | Performed by: PHYSICIAN ASSISTANT

## 2024-05-26 PROCEDURE — 70450 CT HEAD/BRAIN W/O DYE: CPT

## 2024-05-26 PROCEDURE — 87077 CULTURE AEROBIC IDENTIFY: CPT

## 2024-05-26 RX ORDER — QUETIAPINE FUMARATE 25 MG/1
25 TABLET, FILM COATED ORAL NIGHTLY
Status: DISCONTINUED | OUTPATIENT
Start: 2024-05-26 | End: 2024-05-29 | Stop reason: HOSPADM

## 2024-05-26 RX ORDER — SODIUM CHLORIDE 0.9 % (FLUSH) 0.9 %
5-40 SYRINGE (ML) INJECTION PRN
Status: DISCONTINUED | OUTPATIENT
Start: 2024-05-26 | End: 2024-05-29 | Stop reason: HOSPADM

## 2024-05-26 RX ORDER — SODIUM CHLORIDE 0.9 % (FLUSH) 0.9 %
5-40 SYRINGE (ML) INJECTION EVERY 12 HOURS SCHEDULED
Status: DISCONTINUED | OUTPATIENT
Start: 2024-05-26 | End: 2024-05-29 | Stop reason: HOSPADM

## 2024-05-26 RX ORDER — HYDRALAZINE HYDROCHLORIDE 20 MG/ML
10 INJECTION INTRAMUSCULAR; INTRAVENOUS EVERY 6 HOURS PRN
Status: DISCONTINUED | OUTPATIENT
Start: 2024-05-26 | End: 2024-05-29 | Stop reason: HOSPADM

## 2024-05-26 RX ORDER — ACETAMINOPHEN 325 MG/1
650 TABLET ORAL EVERY 6 HOURS PRN
Status: DISCONTINUED | OUTPATIENT
Start: 2024-05-26 | End: 2024-05-29 | Stop reason: HOSPADM

## 2024-05-26 RX ORDER — NICOTINE 21 MG/24HR
1 PATCH, TRANSDERMAL 24 HOURS TRANSDERMAL DAILY
Status: DISCONTINUED | OUTPATIENT
Start: 2024-05-26 | End: 2024-05-29 | Stop reason: HOSPADM

## 2024-05-26 RX ORDER — ONDANSETRON 4 MG/1
4 TABLET, ORALLY DISINTEGRATING ORAL EVERY 8 HOURS PRN
Status: DISCONTINUED | OUTPATIENT
Start: 2024-05-26 | End: 2024-05-29 | Stop reason: HOSPADM

## 2024-05-26 RX ORDER — LANOLIN ALCOHOL/MO/W.PET/CERES
500 CREAM (GRAM) TOPICAL DAILY
Status: DISCONTINUED | OUTPATIENT
Start: 2024-05-27 | End: 2024-05-29 | Stop reason: HOSPADM

## 2024-05-26 RX ORDER — ASPIRIN 81 MG/1
81 TABLET ORAL DAILY
Status: DISCONTINUED | OUTPATIENT
Start: 2024-05-27 | End: 2024-05-29 | Stop reason: HOSPADM

## 2024-05-26 RX ORDER — SODIUM CHLORIDE 9 MG/ML
INJECTION, SOLUTION INTRAVENOUS PRN
Status: DISCONTINUED | OUTPATIENT
Start: 2024-05-26 | End: 2024-05-29 | Stop reason: HOSPADM

## 2024-05-26 RX ORDER — POTASSIUM CHLORIDE 20 MEQ/1
40 TABLET, EXTENDED RELEASE ORAL PRN
Status: DISCONTINUED | OUTPATIENT
Start: 2024-05-26 | End: 2024-05-29 | Stop reason: HOSPADM

## 2024-05-26 RX ORDER — LISINOPRIL 10 MG/1
10 TABLET ORAL DAILY
Status: DISCONTINUED | OUTPATIENT
Start: 2024-05-27 | End: 2024-05-29 | Stop reason: HOSPADM

## 2024-05-26 RX ORDER — AMLODIPINE BESYLATE 5 MG/1
10 TABLET ORAL DAILY
Status: DISCONTINUED | OUTPATIENT
Start: 2024-05-27 | End: 2024-05-29 | Stop reason: HOSPADM

## 2024-05-26 RX ORDER — SODIUM CHLORIDE 9 MG/ML
INJECTION, SOLUTION INTRAVENOUS CONTINUOUS
Status: DISCONTINUED | OUTPATIENT
Start: 2024-05-26 | End: 2024-05-27

## 2024-05-26 RX ORDER — ONDANSETRON 2 MG/ML
4 INJECTION INTRAMUSCULAR; INTRAVENOUS EVERY 6 HOURS PRN
Status: DISCONTINUED | OUTPATIENT
Start: 2024-05-26 | End: 2024-05-29 | Stop reason: HOSPADM

## 2024-05-26 RX ORDER — ENOXAPARIN SODIUM 100 MG/ML
40 INJECTION SUBCUTANEOUS DAILY
Status: DISCONTINUED | OUTPATIENT
Start: 2024-05-26 | End: 2024-05-29 | Stop reason: HOSPADM

## 2024-05-26 RX ORDER — LEVETIRACETAM 500 MG/1
500 TABLET ORAL 2 TIMES DAILY
Status: DISCONTINUED | OUTPATIENT
Start: 2024-05-26 | End: 2024-05-29 | Stop reason: HOSPADM

## 2024-05-26 RX ORDER — ACETAMINOPHEN 650 MG/1
650 SUPPOSITORY RECTAL EVERY 6 HOURS PRN
Status: DISCONTINUED | OUTPATIENT
Start: 2024-05-26 | End: 2024-05-29 | Stop reason: HOSPADM

## 2024-05-26 RX ORDER — ACETAMINOPHEN 325 MG/1
975 TABLET ORAL EVERY 8 HOURS PRN
Status: DISCONTINUED | OUTPATIENT
Start: 2024-05-26 | End: 2024-05-26 | Stop reason: ALTCHOICE

## 2024-05-26 RX ORDER — 0.9 % SODIUM CHLORIDE 0.9 %
500 INTRAVENOUS SOLUTION INTRAVENOUS PRN
Status: DISCONTINUED | OUTPATIENT
Start: 2024-05-26 | End: 2024-05-29 | Stop reason: HOSPADM

## 2024-05-26 RX ORDER — ATORVASTATIN CALCIUM 10 MG/1
10 TABLET, FILM COATED ORAL DAILY
Status: DISCONTINUED | OUTPATIENT
Start: 2024-05-27 | End: 2024-05-29 | Stop reason: HOSPADM

## 2024-05-26 RX ORDER — POTASSIUM CHLORIDE 7.45 MG/ML
10 INJECTION INTRAVENOUS PRN
Status: DISCONTINUED | OUTPATIENT
Start: 2024-05-26 | End: 2024-05-29 | Stop reason: HOSPADM

## 2024-05-26 RX ORDER — LANOLIN ALCOHOL/MO/W.PET/CERES
6 CREAM (GRAM) TOPICAL NIGHTLY
Status: DISCONTINUED | OUTPATIENT
Start: 2024-05-26 | End: 2024-05-29 | Stop reason: HOSPADM

## 2024-05-26 RX ADMIN — WATER 1000 MG: 1 INJECTION INTRAMUSCULAR; INTRAVENOUS; SUBCUTANEOUS at 15:49

## 2024-05-26 RX ADMIN — SODIUM CHLORIDE: 9 INJECTION, SOLUTION INTRAVENOUS at 19:01

## 2024-05-26 RX ADMIN — MELATONIN TAB 3 MG 6 MG: 3 TAB at 21:16

## 2024-05-26 RX ADMIN — QUETIAPINE FUMARATE 25 MG: 25 TABLET ORAL at 21:16

## 2024-05-26 RX ADMIN — LEVETIRACETAM 500 MG: 500 TABLET, FILM COATED ORAL at 21:17

## 2024-05-26 RX ADMIN — SODIUM CHLORIDE 0.5 MG: 9 INJECTION INTRAMUSCULAR; INTRAVENOUS; SUBCUTANEOUS at 13:46

## 2024-05-26 ASSESSMENT — PAIN - FUNCTIONAL ASSESSMENT: PAIN_FUNCTIONAL_ASSESSMENT: NONE - DENIES PAIN
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
## 2024-05-26 NOTE — PLAN OF CARE
Problem: Discharge Planning  Goal: Discharge to home or other facility with appropriate resources  Outcome: Progressing  Flowsheets (Taken 5/26/2024 1846)  Discharge to home or other facility with appropriate resources: Identify barriers to discharge with patient and caregiver     Problem: Safety - Adult  Goal: Free from fall injury  Outcome: Progressing     Problem: Skin/Tissue Integrity  Goal: Absence of new skin breakdown  Description: 1.  Monitor for areas of redness and/or skin breakdown  2.  Assess vascular access sites hourly  3.  Every 4-6 hours minimum:  Change oxygen saturation probe site  4.  Every 4-6 hours:  If on nasal continuous positive airway pressure, respiratory therapy assess nares and determine need for appliance change or resting period.  Outcome: Progressing     Problem: ABCDS Injury Assessment  Goal: Absence of physical injury  Outcome: Progressing     Problem: Risk for Elopement  Goal: Patient will not exit the unit/facility without proper excort  Outcome: Progressing  Flowsheets (Taken 5/26/2024 1800)  Nursing Interventions for Elopement Risk: Assist with personal care needs such as toileting, eating, dressing, as needed to reduce the risk of wandering
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
## 2024-05-26 NOTE — PROGRESS NOTES
Pt admitted for uti, altered mentation    Full h+p to follow    Active Hospital Problems    Diagnosis Date Noted    UTI (urinary tract infection) [N39.0] 05/26/2024    Altered mental state [R41.82] 05/26/2024    Dementia (HCC) [F03.90] 05/26/2024    HTN (hypertension) [I10] 05/26/2024    High cholesterol [E78.00] 05/26/2024       Please use [employer] to contact me with any questions during the day.   The hospitalist service will provide cross-coverage for this patient from 7pm to 7am.    During those hours, contact the on-call hospitalist MD/MAHIN for questions.
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
## 2024-05-27 PROBLEM — R00.1 BRADYCARDIA: Status: ACTIVE | Noted: 2024-05-27

## 2024-05-27 LAB
ANION GAP SERPL CALCULATED.3IONS-SCNC: 10 MMOL/L (ref 3–16)
BASOPHILS # BLD: 0 K/UL (ref 0–0.2)
BASOPHILS NFR BLD: 0.5 %
BUN SERPL-MCNC: 16 MG/DL (ref 7–20)
CALCIUM SERPL-MCNC: 9.3 MG/DL (ref 8.3–10.6)
CHLORIDE SERPL-SCNC: 106 MMOL/L (ref 99–110)
CO2 SERPL-SCNC: 24 MMOL/L (ref 21–32)
CREAT SERPL-MCNC: 0.7 MG/DL (ref 0.6–1.2)
DEPRECATED RDW RBC AUTO: 14.2 % (ref 12.4–15.4)
EKG ATRIAL RATE: 45 BPM
EKG ATRIAL RATE: 53 BPM
EKG DIAGNOSIS: NORMAL
EKG DIAGNOSIS: NORMAL
EKG P AXIS: 52 DEGREES
EKG P AXIS: 57 DEGREES
EKG P-R INTERVAL: 144 MS
EKG P-R INTERVAL: 158 MS
EKG Q-T INTERVAL: 432 MS
EKG Q-T INTERVAL: 466 MS
EKG QRS DURATION: 74 MS
EKG QRS DURATION: 86 MS
EKG QTC CALCULATION (BAZETT): 403 MS
EKG QTC CALCULATION (BAZETT): 405 MS
EKG R AXIS: 0 DEGREES
EKG R AXIS: 1 DEGREES
EKG T AXIS: 45 DEGREES
EKG T AXIS: 50 DEGREES
EKG VENTRICULAR RATE: 45 BPM
EKG VENTRICULAR RATE: 53 BPM
EOSINOPHIL # BLD: 0.1 K/UL (ref 0–0.6)
EOSINOPHIL NFR BLD: 1.8 %
GFR SERPLBLD CREATININE-BSD FMLA CKD-EPI: 89 ML/MIN/{1.73_M2}
GLUCOSE SERPL-MCNC: 89 MG/DL (ref 70–99)
HCT VFR BLD AUTO: 40 % (ref 36–48)
HGB BLD-MCNC: 13.5 G/DL (ref 12–16)
LACTATE BLDV-SCNC: 0.9 MMOL/L (ref 0.4–2)
LYMPHOCYTES # BLD: 2.3 K/UL (ref 1–5.1)
LYMPHOCYTES NFR BLD: 30.3 %
MCH RBC QN AUTO: 30.9 PG (ref 26–34)
MCHC RBC AUTO-ENTMCNC: 33.8 G/DL (ref 31–36)
MCV RBC AUTO: 91.5 FL (ref 80–100)
MONOCYTES # BLD: 0.4 K/UL (ref 0–1.3)
MONOCYTES NFR BLD: 5.6 %
NEUTROPHILS # BLD: 4.7 K/UL (ref 1.7–7.7)
NEUTROPHILS NFR BLD: 61.8 %
PLATELET # BLD AUTO: 247 K/UL (ref 135–450)
PMV BLD AUTO: 7.7 FL (ref 5–10.5)
POTASSIUM SERPL-SCNC: 4.7 MMOL/L (ref 3.5–5.1)
PROCALCITONIN SERPL IA-MCNC: 0.05 NG/ML (ref 0–0.15)
RBC # BLD AUTO: 4.37 M/UL (ref 4–5.2)
SODIUM SERPL-SCNC: 140 MMOL/L (ref 136–145)
WBC # BLD AUTO: 7.6 K/UL (ref 4–11)

## 2024-05-27 PROCEDURE — 6370000000 HC RX 637 (ALT 250 FOR IP): Performed by: INTERNAL MEDICINE

## 2024-05-27 PROCEDURE — 2580000003 HC RX 258: Performed by: INTERNAL MEDICINE

## 2024-05-27 PROCEDURE — 6360000002 HC RX W HCPCS: Performed by: INTERNAL MEDICINE

## 2024-05-27 PROCEDURE — 80048 BASIC METABOLIC PNL TOTAL CA: CPT

## 2024-05-27 PROCEDURE — 84145 PROCALCITONIN (PCT): CPT

## 2024-05-27 PROCEDURE — 1200000000 HC SEMI PRIVATE

## 2024-05-27 PROCEDURE — 93010 ELECTROCARDIOGRAM REPORT: CPT | Performed by: INTERNAL MEDICINE

## 2024-05-27 PROCEDURE — 83605 ASSAY OF LACTIC ACID: CPT

## 2024-05-27 PROCEDURE — 93005 ELECTROCARDIOGRAM TRACING: CPT | Performed by: INTERNAL MEDICINE

## 2024-05-27 PROCEDURE — 85025 COMPLETE CBC W/AUTO DIFF WBC: CPT

## 2024-05-27 PROCEDURE — 36415 COLL VENOUS BLD VENIPUNCTURE: CPT

## 2024-05-27 RX ORDER — SODIUM CHLORIDE 9 MG/ML
INJECTION, SOLUTION INTRAVENOUS CONTINUOUS
Status: DISCONTINUED | OUTPATIENT
Start: 2024-05-27 | End: 2024-05-29 | Stop reason: HOSPADM

## 2024-05-27 RX ADMIN — LISINOPRIL 10 MG: 10 TABLET ORAL at 08:17

## 2024-05-27 RX ADMIN — ATORVASTATIN CALCIUM 10 MG: 10 TABLET, FILM COATED ORAL at 08:17

## 2024-05-27 RX ADMIN — ENOXAPARIN SODIUM 40 MG: 100 INJECTION SUBCUTANEOUS at 08:15

## 2024-05-27 RX ADMIN — LEVETIRACETAM 500 MG: 500 TABLET, FILM COATED ORAL at 08:17

## 2024-05-27 RX ADMIN — SODIUM CHLORIDE: 9 INJECTION, SOLUTION INTRAVENOUS at 18:04

## 2024-05-27 RX ADMIN — WATER 1000 MG: 1 INJECTION INTRAMUSCULAR; INTRAVENOUS; SUBCUTANEOUS at 16:03

## 2024-05-27 RX ADMIN — SERTRALINE 100 MG: 50 TABLET, FILM COATED ORAL at 08:17

## 2024-05-27 RX ADMIN — AMLODIPINE BESYLATE 10 MG: 5 TABLET ORAL at 08:17

## 2024-05-27 RX ADMIN — ASPIRIN 81 MG: 81 TABLET, COATED ORAL at 08:17

## 2024-05-27 RX ADMIN — CYANOCOBALAMIN TAB 1000 MCG 500 MCG: 1000 TAB at 08:17

## 2024-05-27 RX ADMIN — ZIPRASIDONE MESYLATE 20 MG: 20 INJECTION, POWDER, LYOPHILIZED, FOR SOLUTION INTRAMUSCULAR at 18:44

## 2024-05-27 NOTE — PROGRESS NOTES
Shift assessment completed. Routine vitals obtained by PCA, bradycardic on monitor, MD notified, EKG and tele orders placed. Scheduled medications given. Patient is awake, alert and oriented to self only, currently calm and cooperative. Respirations are easy and unlabored. Patient does not appear to be in distress, resting comfortably at this time. Call light within reach. Fall precautions in place. Sitter/PCA at bedside for safety,
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
## 2024-05-27 NOTE — PROGRESS NOTES
Progress Note - Dr. Vergara - Internal Medicine  PCP: Diego Dye  [facility] [address] [phone]    Hospital Day: 1  Code Status: Full Code  Current Diet: ADULT DIET; Regular        CC: follow up on medical issues    Subjective:   Digna Baxter is a 74 y.o. female.    Pt seen and examined  Chart reviewed since last visit, labs and imaging below      Doing ok  HR noted to be low in 40s- but pt asymptomatic    NGTD on ucx      Review of Systems: (1 system for EPF, 2-9 for detailed, 10+ for comprehensive)  Constitutional: Negative for chills and fever.     HENT: Negative for dental problem, nosebleeds and rhinorrhea.      Eyes: Negative for photophobia and visual disturbance.     Respiratory: Negative for cough, chest tightness and shortness of breath.      Cardiovascular: Negative for chest pain and leg swelling.     Gastrointestinal: Negative for diarrhea, nausea and vomiting.     Endocrine: Negative for polydipsia and polyphagia.     Genitourinary: Negative for frequency, hematuria and urgency.     Musculoskeletal: Negative for back pain and myalgias.     Skin: Negative for rash.     Allergic/Immunologic: Negative for food allergies.     Neurological: Negative for dizziness, seizures, syncope and facial asymmetry.     Hematological: Negative for adenopathy.     Psychiatric/Behavioral: Negative for dysphoric mood. The patient is not nervous/anxious.        I have reviewed the patient's medical and social history in detail and updated the computerized patient record.  To recap: She  has a past medical history of Depression and Hypertension.. She  has no past surgical history on file.. She  reports that she has been smoking cigarettes. She has a 7.5 pack-year smoking history. She has never used smokeless tobacco. She reports current drug use. Drug: Marijuana (Weed). She reports that she does not drink alcohol..        Active Hospital Problems    Diagnosis Date Noted    Bradycardia  me.  HR in 40-50s, but pt asymptomatic  Plan: cont to monitor on tele      Case discussed with: RN  Tests ordered/reviewed: cbc, bmp, ucx, procal          (Please note that portions of this note were completed with a voice recognition program.  Efforts were made to edit the dictations but occasionally words are mis-transcribed.)        Edmond Vergara MD  5/27/2024    Please use [employer] to contact me with any questions during the day.   The hospitalist service will provide cross-coverage for this patient from 7pm to 7am.    During those hours, contact the on-call hospitalist MD/MAHIN for questions.

## 2024-05-27 NOTE — SIGNIFICANT EVENT
Code violet called for agitation.    Patient removed IV and extremely agitated.    She has been escorted back to bed by nursing.    I have ordered Geodon 20 mg IM X 1 now and q12h PRN.    Have offered Ativan 1 mg IV q6h PRN severe agitation.    IVF requested after IV replaced.    Use restraints as needed.    Discussed with nursing and security.    Tony Perea MD
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
## 2024-05-27 NOTE — PLAN OF CARE
Problem: Discharge Planning  Goal: Discharge to home or other facility with appropriate resources  5/27/2024 0948 by Radha Garcia RN  Outcome: Progressing     Problem: Safety - Adult  Goal: Free from fall injury  5/27/2024 0948 by Radha Garcia RN  Outcome: Progressing     Problem: Skin/Tissue Integrity  Goal: Absence of new skin breakdown  Description: 1.  Monitor for areas of redness and/or skin breakdown  2.  Assess vascular access sites hourly  3.  Every 4-6 hours minimum:  Change oxygen saturation probe site  4.  Every 4-6 hours:  If on nasal continuous positive airway pressure, respiratory therapy assess nares and determine need for appliance change or resting period.  5/27/2024 0948 by Radha Garcia RN  Outcome: Progressing     Problem: ABCDS Injury Assessment  Goal: Absence of physical injury  5/27/2024 0948 by Radha Garcia, RN  Outcome: Progressing     Problem: Risk for Elopement  Goal: Patient will not exit the unit/facility without proper excort  5/27/2024 0948 by Radha Garcia, RN  Outcome: Progressing

## 2024-05-27 NOTE — PLAN OF CARE
Problem: Discharge Planning  Goal: Discharge to home or other facility with appropriate resources  5/26/2024 2212 by Isis Andersen RN  Outcome: Progressing  5/26/2024 1854 by Sharonda Olivo RN  Outcome: Progressing  Flowsheets (Taken 5/26/2024 1846)  Discharge to home or other facility with appropriate resources: Identify barriers to discharge with patient and caregiver     Problem: Safety - Adult  Goal: Free from fall injury  5/26/2024 2212 by Isis Andersen RN  Outcome: Progressing  5/26/2024 1854 by Sharonda Olivo RN  Outcome: Progressing     Problem: Skin/Tissue Integrity  Goal: Absence of new skin breakdown  Description: 1.  Monitor for areas of redness and/or skin breakdown  2.  Assess vascular access sites hourly  3.  Every 4-6 hours minimum:  Change oxygen saturation probe site  4.  Every 4-6 hours:  If on nasal continuous positive airway pressure, respiratory therapy assess nares and determine need for appliance change or resting period.  5/26/2024 2212 by Isis Andersen RN  Outcome: Progressing  5/26/2024 1854 by Sharonda Olivo RN  Outcome: Progressing     Problem: ABCDS Injury Assessment  Goal: Absence of physical injury  5/26/2024 2212 by Isis Andersen RN  Outcome: Progressing  5/26/2024 1854 by Sharonda Olivo RN  Outcome: Progressing     Problem: Risk for Elopement  Goal: Patient will not exit the unit/facility without proper excort  5/26/2024 2212 by Isis Andersen RN  Outcome: Progressing  5/26/2024 1854 by Sharonda Olivo RN  Outcome: Progressing  Flowsheets (Taken 5/26/2024 1800)  Nursing Interventions for Elopement Risk: Assist with personal care needs such as toileting, eating, dressing, as needed to reduce the risk of wandering

## 2024-05-28 LAB
BACTERIA UR CULT: ABNORMAL
ORGANISM: ABNORMAL

## 2024-05-28 PROCEDURE — 2580000003 HC RX 258: Performed by: INTERNAL MEDICINE

## 2024-05-28 PROCEDURE — 1200000000 HC SEMI PRIVATE

## 2024-05-28 PROCEDURE — 97167 OT EVAL HIGH COMPLEX 60 MIN: CPT

## 2024-05-28 PROCEDURE — 6360000002 HC RX W HCPCS: Performed by: INTERNAL MEDICINE

## 2024-05-28 PROCEDURE — 6370000000 HC RX 637 (ALT 250 FOR IP): Performed by: INTERNAL MEDICINE

## 2024-05-28 PROCEDURE — A4216 STERILE WATER/SALINE, 10 ML: HCPCS | Performed by: INTERNAL MEDICINE

## 2024-05-28 PROCEDURE — 97162 PT EVAL MOD COMPLEX 30 MIN: CPT

## 2024-05-28 PROCEDURE — 97535 SELF CARE MNGMENT TRAINING: CPT

## 2024-05-28 PROCEDURE — 97530 THERAPEUTIC ACTIVITIES: CPT

## 2024-05-28 RX ORDER — AMOXICILLIN AND CLAVULANATE POTASSIUM 875; 125 MG/1; MG/1
1 TABLET, FILM COATED ORAL EVERY 12 HOURS SCHEDULED
Status: DISCONTINUED | OUTPATIENT
Start: 2024-05-28 | End: 2024-05-29 | Stop reason: HOSPADM

## 2024-05-28 RX ORDER — LORAZEPAM 1 MG/1
1 TABLET ORAL EVERY 6 HOURS PRN
Status: DISCONTINUED | OUTPATIENT
Start: 2024-05-28 | End: 2024-05-29 | Stop reason: HOSPADM

## 2024-05-28 RX ADMIN — AMOXICILLIN AND CLAVULANATE POTASSIUM 1 TABLET: 875; 125 TABLET, FILM COATED ORAL at 21:11

## 2024-05-28 RX ADMIN — CYANOCOBALAMIN TAB 1000 MCG 500 MCG: 1000 TAB at 08:47

## 2024-05-28 RX ADMIN — LEVETIRACETAM 500 MG: 500 TABLET, FILM COATED ORAL at 21:11

## 2024-05-28 RX ADMIN — AMLODIPINE BESYLATE 10 MG: 5 TABLET ORAL at 08:47

## 2024-05-28 RX ADMIN — ATORVASTATIN CALCIUM 10 MG: 10 TABLET, FILM COATED ORAL at 08:47

## 2024-05-28 RX ADMIN — ENOXAPARIN SODIUM 40 MG: 100 INJECTION SUBCUTANEOUS at 08:49

## 2024-05-28 RX ADMIN — SERTRALINE 100 MG: 50 TABLET, FILM COATED ORAL at 08:46

## 2024-05-28 RX ADMIN — ASPIRIN 81 MG: 81 TABLET, COATED ORAL at 08:47

## 2024-05-28 RX ADMIN — ZIPRASIDONE MESYLATE 20 MG: 20 INJECTION, POWDER, LYOPHILIZED, FOR SOLUTION INTRAMUSCULAR at 12:52

## 2024-05-28 RX ADMIN — LISINOPRIL 10 MG: 10 TABLET ORAL at 08:46

## 2024-05-28 RX ADMIN — LORAZEPAM 1 MG: 1 TABLET ORAL at 18:01

## 2024-05-28 RX ADMIN — LEVETIRACETAM 500 MG: 500 TABLET, FILM COATED ORAL at 08:46

## 2024-05-28 RX ADMIN — QUETIAPINE FUMARATE 25 MG: 25 TABLET ORAL at 21:11

## 2024-05-28 RX ADMIN — MELATONIN TAB 3 MG 6 MG: 3 TAB at 21:11

## 2024-05-28 RX ADMIN — SODIUM CHLORIDE, PRESERVATIVE FREE 10 ML: 5 INJECTION INTRAVENOUS at 08:49

## 2024-05-28 RX ADMIN — SODIUM CHLORIDE 1 MG: 9 INJECTION INTRAMUSCULAR; INTRAVENOUS; SUBCUTANEOUS at 00:29

## 2024-05-28 ASSESSMENT — PAIN SCALES - GENERAL: PAINLEVEL_OUTOF10: 0
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
## 2024-05-28 NOTE — PROGRESS NOTES
2315: Shift assessment completed. VSS. Medications given per MAR. Bedside table and call light within reach. The care plan and education has been reviewed and mutually agreed upon with the patient.    Isis Andersen RN

## 2024-05-28 NOTE — PROGRESS NOTES
Progress Note - Dr. Vergara - Internal Medicine  PCP: Diego Dye  [facility] [address] [phone]    Hospital Day: 2  Code Status: Full Code  Current Diet: ADULT DIET; Regular        CC: follow up on medical issues    Subjective:   Digna Baxter is a 74 y.o. female.    Pt seen and examined  Chart reviewed since last visit, labs and imaging below      Doing ok  HR noted to be low in 40s at times, but then up into 70s at other times - pt asymptomatic    E coli on ucx  Awaiting sensitivities    Review of Systems: (1 system for EPF, 2-9 for detailed, 10+ for comprehensive)  Constitutional: Negative for chills and fever.     HENT: Negative for dental problem, nosebleeds and rhinorrhea.      Eyes: Negative for photophobia and visual disturbance.     Respiratory: Negative for cough, chest tightness and shortness of breath.      Cardiovascular: Negative for chest pain and leg swelling.     Gastrointestinal: Negative for diarrhea, nausea and vomiting.     Endocrine: Negative for polydipsia and polyphagia.     Genitourinary: Negative for frequency, hematuria and urgency.     Musculoskeletal: Negative for back pain and myalgias.     Skin: Negative for rash.     Allergic/Immunologic: Negative for food allergies.     Neurological: Negative for dizziness, seizures, syncope and facial asymmetry.     Hematological: Negative for adenopathy.     Psychiatric/Behavioral: Negative for dysphoric mood. The patient is not nervous/anxious.        I have reviewed the patient's medical and social history in detail and updated the computerized patient record.  To recap: She  has a past medical history of Depression and Hypertension.. She  has no past surgical history on file.. She  reports that she has been smoking cigarettes. She has a 7.5 pack-year smoking history. She has never used smokeless tobacco. She reports current drug use. Drug: Marijuana (Weed). She reports that she does not drink

## 2024-05-28 NOTE — PLAN OF CARE
Problem: Discharge Planning  Goal: Discharge to home or other facility with appropriate resources  5/28/2024 1324 by Ana Murphy RN  Outcome: Progressing  5/28/2024 0302 by Isis Andersen RN  Outcome: Progressing     Problem: Safety - Adult  Goal: Free from fall injury  5/28/2024 1324 by Ana Murphy RN  Outcome: Progressing  5/28/2024 0302 by Isis Andersen RN  Outcome: Progressing     Problem: Skin/Tissue Integrity  Goal: Absence of new skin breakdown  Description: 1.  Monitor for areas of redness and/or skin breakdown  2.  Assess vascular access sites hourly  3.  Every 4-6 hours minimum:  Change oxygen saturation probe site  4.  Every 4-6 hours:  If on nasal continuous positive airway pressure, respiratory therapy assess nares and determine need for appliance change or resting period.  5/28/2024 1324 by Ana Murphy RN  Outcome: Progressing  5/28/2024 0302 by Isis Andersen RN  Outcome: Progressing     Problem: ABCDS Injury Assessment  Goal: Absence of physical injury  5/28/2024 1324 by Ana Murphy RN  Outcome: Progressing  5/28/2024 0302 by Isis Andersen RN  Outcome: Progressing     Problem: Risk for Elopement  Goal: Patient will not exit the unit/facility without proper excort  5/28/2024 1324 by Ana Murphy RN  Outcome: Progressing  5/28/2024 0302 by Isis Andersen RN  Outcome: Progressing     Problem: Pain  Goal: Verbalizes/displays adequate comfort level or baseline comfort level  Outcome: Progressing      Clark

## 2024-05-28 NOTE — PLAN OF CARE
Problem: Discharge Planning  Goal: Discharge to home or other facility with appropriate resources  Outcome: Progressing     Problem: Safety - Adult  Goal: Free from fall injury  Outcome: Progressing     Problem: Skin/Tissue Integrity  Goal: Absence of new skin breakdown  Description: 1.  Monitor for areas of redness and/or skin breakdown  2.  Assess vascular access sites hourly  3.  Every 4-6 hours minimum:  Change oxygen saturation probe site  4.  Every 4-6 hours:  If on nasal continuous positive airway pressure, respiratory therapy assess nares and determine need for appliance change or resting period.  Outcome: Progressing     Problem: ABCDS Injury Assessment  Goal: Absence of physical injury  Outcome: Progressing     Problem: Risk for Elopement  Goal: Patient will not exit the unit/facility without proper excort  Outcome: Progressing

## 2024-05-29 ENCOUNTER — HOSPITAL ENCOUNTER (EMERGENCY)
Age: 74
Discharge: HOME | End: 2024-05-30
Attending: EMERGENCY MEDICINE
Payer: MEDICARE

## 2024-05-29 ENCOUNTER — APPOINTMENT (OUTPATIENT)
Dept: CT IMAGING | Age: 74
End: 2024-05-29
Payer: MEDICARE

## 2024-05-29 VITALS
BODY MASS INDEX: 24.43 KG/M2 | SYSTOLIC BLOOD PRESSURE: 144 MMHG | DIASTOLIC BLOOD PRESSURE: 78 MMHG | HEIGHT: 66 IN | TEMPERATURE: 97.6 F | WEIGHT: 152 LBS | OXYGEN SATURATION: 97 % | HEART RATE: 78 BPM | RESPIRATION RATE: 19 BRPM

## 2024-05-29 DIAGNOSIS — S09.90XA CLOSED HEAD INJURY, INITIAL ENCOUNTER: ICD-10-CM
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
## 2024-05-29 LAB
ANION GAP SERPL CALCULATED.3IONS-SCNC: 12 MMOL/L (ref 3–16)
BASOPHILS # BLD: 0 K/UL (ref 0–0.2)
BASOPHILS NFR BLD: 0.4 %
BUN SERPL-MCNC: 19 MG/DL (ref 7–20)
CALCIUM SERPL-MCNC: 9.5 MG/DL (ref 8.3–10.6)
CHLORIDE SERPL-SCNC: 108 MMOL/L (ref 99–110)
CO2 SERPL-SCNC: 21 MMOL/L (ref 21–32)
CREAT SERPL-MCNC: 0.7 MG/DL (ref 0.6–1.2)
DEPRECATED RDW RBC AUTO: 14.4 % (ref 12.4–15.4)
EOSINOPHIL # BLD: 0.1 K/UL (ref 0–0.6)
EOSINOPHIL NFR BLD: 0.8 %
GFR SERPLBLD CREATININE-BSD FMLA CKD-EPI: 89 ML/MIN/{1.73_M2}
GLUCOSE SERPL-MCNC: 109 MG/DL (ref 70–99)
HCT VFR BLD AUTO: 42.7 % (ref 36–48)
HGB BLD-MCNC: 14.1 G/DL (ref 12–16)
LYMPHOCYTES # BLD: 2.3 K/UL (ref 1–5.1)
LYMPHOCYTES NFR BLD: 26.2 %
MCH RBC QN AUTO: 30.2 PG (ref 26–34)
MCHC RBC AUTO-ENTMCNC: 33 G/DL (ref 31–36)
MCV RBC AUTO: 91.6 FL (ref 80–100)
MONOCYTES # BLD: 0.6 K/UL (ref 0–1.3)
MONOCYTES NFR BLD: 6.3 %
NEUTROPHILS # BLD: 5.9 K/UL (ref 1.7–7.7)
NEUTROPHILS NFR BLD: 66.3 %
PLATELET # BLD AUTO: 271 K/UL (ref 135–450)
PMV BLD AUTO: 8.4 FL (ref 5–10.5)
POTASSIUM SERPL-SCNC: 4.2 MMOL/L (ref 3.5–5.1)
RBC # BLD AUTO: 4.66 M/UL (ref 4–5.2)
SODIUM SERPL-SCNC: 141 MMOL/L (ref 136–145)
WBC # BLD AUTO: 8.8 K/UL (ref 4–11)

## 2024-05-29 PROCEDURE — 99284 EMERGENCY DEPT VISIT MOD MDM: CPT

## 2024-05-29 PROCEDURE — 6360000002 HC RX W HCPCS: Performed by: INTERNAL MEDICINE

## 2024-05-29 PROCEDURE — 85025 COMPLETE CBC W/AUTO DIFF WBC: CPT

## 2024-05-29 PROCEDURE — 2580000003 HC RX 258: Performed by: INTERNAL MEDICINE

## 2024-05-29 PROCEDURE — 70450 CT HEAD/BRAIN W/O DYE: CPT

## 2024-05-29 PROCEDURE — 72125 CT NECK SPINE W/O DYE: CPT

## 2024-05-29 PROCEDURE — 36415 COLL VENOUS BLD VENIPUNCTURE: CPT

## 2024-05-29 PROCEDURE — 80048 BASIC METABOLIC PNL TOTAL CA: CPT

## 2024-05-29 PROCEDURE — 6370000000 HC RX 637 (ALT 250 FOR IP): Performed by: INTERNAL MEDICINE

## 2024-05-29 RX ORDER — AMOXICILLIN AND CLAVULANATE POTASSIUM 875; 125 MG/1; MG/1
1 TABLET, FILM COATED ORAL EVERY 12 HOURS SCHEDULED
Qty: 9 TABLET | Refills: 0 | Status: SHIPPED | OUTPATIENT
Start: 2024-05-29 | End: 2024-06-03

## 2024-05-29 RX ADMIN — AMOXICILLIN AND CLAVULANATE POTASSIUM 1 TABLET: 875; 125 TABLET, FILM COATED ORAL at 09:07

## 2024-05-29 RX ADMIN — LORAZEPAM 1 MG: 1 TABLET ORAL at 09:08

## 2024-05-29 RX ADMIN — LISINOPRIL 10 MG: 10 TABLET ORAL at 09:07

## 2024-05-29 RX ADMIN — CYANOCOBALAMIN TAB 1000 MCG 500 MCG: 1000 TAB at 09:08

## 2024-05-29 RX ADMIN — ATORVASTATIN CALCIUM 10 MG: 10 TABLET, FILM COATED ORAL at 09:08

## 2024-05-29 RX ADMIN — LEVETIRACETAM 500 MG: 500 TABLET, FILM COATED ORAL at 09:08

## 2024-05-29 RX ADMIN — SERTRALINE 100 MG: 50 TABLET, FILM COATED ORAL at 09:07

## 2024-05-29 RX ADMIN — ZIPRASIDONE MESYLATE 20 MG: 20 INJECTION, POWDER, LYOPHILIZED, FOR SOLUTION INTRAMUSCULAR at 09:24

## 2024-05-29 RX ADMIN — AMLODIPINE BESYLATE 10 MG: 5 TABLET ORAL at 09:07

## 2024-05-29 RX ADMIN — ASPIRIN 81 MG: 81 TABLET, COATED ORAL at 09:08

## 2024-05-29 RX ADMIN — ENOXAPARIN SODIUM 40 MG: 100 INJECTION SUBCUTANEOUS at 09:24

## 2024-05-29 ASSESSMENT — PAIN - FUNCTIONAL ASSESSMENT: PAIN_FUNCTIONAL_ASSESSMENT: NONE - DENIES PAIN

## 2024-05-29 ASSESSMENT — PAIN SCALES - GENERAL: PAINLEVEL_OUTOF10: 0

## 2024-05-29 NOTE — CARE COORDINATION
Discharge Plan:     Patient discharged to:    [facility]  [address] [place] [address]    SW/DC Planner faxed, NICK and AVS to: [phone]    Narcotic Prescriptions faxed were: N/A    RN: will call report to: [phone]    Medical Transport with: [facility] [phone]     time: 1300    Family advised of discharge?: yes, Nargis Baxter, daughter    YOSEF Submitted?:  N/A    All discharge needs met per case management.    GABINO NewmanN RN    [facility]  Phone: [phone]          
  06/07/2023 164 (H)            Covid Test requirement for return: No Rapid/PCR Covid test needed before return      Additional Case Management Notes:     - Patient is from [facility]    - no pre-cert required to return    The Plan for Transition of Care is related to the following treatment goals of Acute cystitis without hematuria [N30.00]  Complicated UTI (urinary tract infection) [N39.0]  Altered mental status, unspecified altered mental status type [R41.82]    The Patient and/or Patient Representative Agree with the Discharge Plan?      Wil Shaw Jr, RN  Case Management Department  Ph: [phone] Fax: [phone]
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
## 2024-05-29 NOTE — DISCHARGE INSTR - COC
Continuity of Care Form    Patient Name: Digna Baxter   :  1950  MRN:  [id number]    Admit date:  2024  Discharge date:  24    Code Status Order: Full Code   Advance Directives:     Admitting Physician:  Edmond Vergara MD  PCP: Diego Dye MD    Discharging Nurse: Susan Cantuarging Hospital Unit/Room#: 4TN-4469/4469-01  Discharging Unit Phone Number: [phone]    Emergency Contact:   Extended Emergency Contact Information  Primary Emergency Contact: Nargis Baxter  Home Phone: [phone]  Relation: Child    Past Surgical History:  History reviewed. No pertinent surgical history.    Immunization History:   Immunization History   Administered Date(s) Administered    COVID-19, MODERNA BLUE border, Primary or Immunocompromised, (age 12y+), IM, 100 mcg/0.5mL 2021, 2021    COVID-19, MODERNA Booster BLUE border, (age 18y+), IM, 50mcg/0.25mL 2021       Active Problems:  Patient Active Problem List   Diagnosis Code    Symptom of syncope R55    UTI (urinary tract infection) N39.0    Altered mental state R41.82    Dementia (HCC) F03.90    HTN (hypertension) I10    High cholesterol E78.00    Complicated UTI (urinary tract infection) N39.0    Bradycardia R00.1       Isolation/Infection:   Isolation            No Isolation          Patient Infection Status       None to display            Nurse Assessment:  Last Vital Signs: BP (!) 156/80   Pulse (!) 48   Temp 98.4 °F (36.9 °C) (Oral)   Resp 17   Ht 1.676 m (5' 6\")   Wt 68.9 kg (152 lb)   SpO2 98%   BMI 24.53 kg/m²     Last documented pain score (0-10 scale): Pain Level: 0  Last Weight:   Wt Readings from Last 1 Encounters:   24 68.9 kg (152 lb)     Mental Status:  alert and confused    IV Access:  - None    Nursing Mobility/ADLs:  Walking   Assisted  Transfer  Assisted  Bathing  Assisted  Dressing  Assisted  Toileting  Assisted  Feeding  Independent  Med Admin  Dependent  Med Delivery   whole    Wound Care

## 2024-05-29 NOTE — PLAN OF CARE
Problem: Discharge Planning  Goal: Discharge to home or other facility with appropriate resources  Outcome: Completed     Problem: Safety - Adult  Goal: Free from fall injury  Outcome: Completed     Problem: Skin/Tissue Integrity  Goal: Absence of new skin breakdown  Description: 1.  Monitor for areas of redness and/or skin breakdown  2.  Assess vascular access sites hourly  3.  Every 4-6 hours minimum:  Change oxygen saturation probe site  4.  Every 4-6 hours:  If on nasal continuous positive airway pressure, respiratory therapy assess nares and determine need for appliance change or resting period.  Outcome: Completed     Problem: ABCDS Injury Assessment  Goal: Absence of physical injury  Outcome: Completed     Problem: Risk for Elopement  Goal: Patient will not exit the unit/facility without proper excort  Outcome: Completed     Problem: Pain  Goal: Verbalizes/displays adequate comfort level or baseline comfort level  Outcome: Completed  Flowsheets (Taken 5/29/2024 0900)  Verbalizes/displays adequate comfort level or baseline comfort level: Assess pain using appropriate pain scale

## 2024-05-30 ENCOUNTER — TELEPHONE (OUTPATIENT)
Dept: PRIMARY CARE CLINIC | Age: 74
End: 2024-05-30

## 2024-05-30 VITALS
BODY MASS INDEX: 24.53 KG/M2 | SYSTOLIC BLOOD PRESSURE: 118 MMHG | RESPIRATION RATE: 16 BRPM | HEART RATE: 61 BPM | OXYGEN SATURATION: 90 % | HEIGHT: 66 IN | TEMPERATURE: 98.1 F | DIASTOLIC BLOOD PRESSURE: 60 MMHG

## 2024-05-30 NOTE — PROGRESS NOTES
Report given to transport team. All question answered. All belongings with pt. Disconnect from ed monitoring system
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
## 2024-05-30 NOTE — ADT AUTH CERT
Patient Demographics    Name Patient ID SSN Gender Identity Birth Date   Digna Baxter [id number]  Female [birth year] (74 yrs)     Address Phone Email Employer    [address]  [place] [address] [phone] (H)  [phone] (M) [email or web address] RETIRED      County Race Occupation Emp Status    [employer]  -- Retired      Reg Status PCP Date Last Verified Next Review Date    Verified Diego Dye MD  [phone] 05/16/24 06/15/24      Admission Date Discharge Date Admitting Provider     05/26/24 05/29/24 Edmond Vergara MD       Marital Status Pentecostal       Hinduism        Emergency Contact 1 Emergency Contact 2 Emergency Contact 3   Nargis Baxter (C)  [phone] (H)  [phone] (M) Anusha Lyon (C)  [phone] (H) Jimena Baxter (D)  [phone] (H)     Physician Progress Notes  Notes from 05/27/24 through 05/30/24  Progress Notes by Edmond Vergara MD at 5/28/2024  8:46 AM    Author: Edmond Vergara MD Service: Internal Medicine Author Type: Physician   Filed: 5/28/2024  8:47 AM Date of Service: 5/28/2024  8:46 AM Status: Signed   : Edmond Vergara MD (Physician)   Expand All Collapse All  Progress Note - Dr. Vergara - Internal Medicine  PCP: Diego Dye MD [address] [place] [address] [phone]     Hospital Day: 2  Code Status: Full Code  Current Diet: ADULT DIET; Regular           CC: follow up on medical issues     Subjective:   Digna Baxter is a 74 y.o. female.     Pt seen and examined  Chart reviewed since last visit, labs and imaging below        Doing ok  HR noted to be low in 40s at times, but then up into 70s at other times - pt asymptomatic     E coli on ucx  Awaiting sensitivities     Review of Systems: (1 system for EPF, 2-9 for detailed, 10+ for comprehensive)  Constitutional: Negative for chills and fever.     HENT: Negative for dental problem, nosebleeds and rhinorrhea.      Eyes:

## 2024-05-30 NOTE — TELEPHONE ENCOUNTER
Care Transitions Initial Follow Up Call    Outreach made within 2 business days of discharge: Yes    Patient: Digna Baxter Patient : 1950   MRN: [id number]  Reason for Admission: There are no discharge diagnoses documented for the most recent discharge.  Discharge Date: 24       Spoke with: PT was DC to SNF, no TCM required at this time.    Discharge department/facility: [facility]    TCM Interactive Patient Contact:  Was patient able to fill all prescriptions:   Was patient instructed to bring all medications to the follow-up visit:   Is patient taking all medications as directed in the discharge summary?   Does patient understand their discharge instructions:   Does patient have questions or concerns that need addressed prior to 7-14 day follow up office visit:     Scheduled appointment with PCP within 7-14 days    Follow Up  No future appointments.    Tolu Harman LPN

## 2024-05-30 NOTE — ED TRIAGE NOTES
C/o witnessed fall at [facility]. Hit front portion of head.  No loss of consciousness. Not taking blood thinners.

## 2024-05-31 ENCOUNTER — APPOINTMENT (OUTPATIENT)
Dept: GENERAL RADIOLOGY | Age: 74
End: 2024-05-31
Payer: MEDICARE

## 2024-05-31 ENCOUNTER — HOSPITAL ENCOUNTER (EMERGENCY)
Age: 74
Discharge: ANOTHER HOSPITAL | End: 2024-06-01
Attending: EMERGENCY MEDICINE
Payer: MEDICARE

## 2024-05-31 DIAGNOSIS — R44.3 HALLUCINATIONS: ICD-10-CM

## 2024-05-31 DIAGNOSIS — F03.911 AGITATION DUE TO DEMENTIA (HCC): Primary | ICD-10-CM

## 2024-05-31 LAB
AMPHETAMINES UR QL SCN>1000 NG/ML: NORMAL
AMPHETAMINES UR QL SCN>1000 NG/ML: NORMAL
ANION GAP SERPL CALCULATED.3IONS-SCNC: 9 MMOL/L (ref 3–16)
ANION GAP SERPL CALCULATED.3IONS-SCNC: 9 MMOL/L (ref 3–16)
APAP SERPL-MCNC: <5 UG/ML (ref 10–30)
APAP SERPL-MCNC: <5 UG/ML (ref 10–30)
BACTERIA URNS QL MICRO: ABNORMAL /HPF
BACTERIA URNS QL MICRO: ABNORMAL /HPF
BARBITURATES UR QL SCN>200 NG/ML: NORMAL
BARBITURATES UR QL SCN>200 NG/ML: NORMAL
BASOPHILS # BLD: 0 K/UL (ref 0–0.2)
BASOPHILS # BLD: 0 K/UL (ref 0–0.2)
BASOPHILS NFR BLD: 0.3 %
BASOPHILS NFR BLD: 0.3 %
BENZODIAZ UR QL SCN>200 NG/ML: NORMAL
BENZODIAZ UR QL SCN>200 NG/ML: NORMAL
BILIRUB UR QL STRIP.AUTO: NEGATIVE
BILIRUB UR QL STRIP.AUTO: NEGATIVE
BUN SERPL-MCNC: 25 MG/DL (ref 7–20)
BUN SERPL-MCNC: 25 MG/DL (ref 7–20)
CALCIUM SERPL-MCNC: 9.5 MG/DL (ref 8.3–10.6)
CALCIUM SERPL-MCNC: 9.5 MG/DL (ref 8.3–10.6)
CANNABINOIDS UR QL SCN>50 NG/ML: NORMAL
CANNABINOIDS UR QL SCN>50 NG/ML: NORMAL
CHLORIDE SERPL-SCNC: 106 MMOL/L (ref 99–110)
CHLORIDE SERPL-SCNC: 106 MMOL/L (ref 99–110)
CLARITY UR: CLEAR
CLARITY UR: CLEAR
CO2 SERPL-SCNC: 26 MMOL/L (ref 21–32)
CO2 SERPL-SCNC: 26 MMOL/L (ref 21–32)
COCAINE UR QL SCN: NORMAL
COCAINE UR QL SCN: NORMAL
COLOR UR: YELLOW
COLOR UR: YELLOW
CREAT SERPL-MCNC: 1 MG/DL (ref 0.6–1.2)
CREAT SERPL-MCNC: 1 MG/DL (ref 0.6–1.2)
DEPRECATED RDW RBC AUTO: 14.6 % (ref 12.4–15.4)
DEPRECATED RDW RBC AUTO: 14.6 % (ref 12.4–15.4)
DRUG SCREEN COMMENT UR-IMP: NORMAL
DRUG SCREEN COMMENT UR-IMP: NORMAL
EOSINOPHIL # BLD: 0.2 K/UL (ref 0–0.6)
EOSINOPHIL # BLD: 0.2 K/UL (ref 0–0.6)
EOSINOPHIL NFR BLD: 1.9 %
EOSINOPHIL NFR BLD: 1.9 %
EPI CELLS #/AREA URNS AUTO: 1 /HPF (ref 0–5)
EPI CELLS #/AREA URNS AUTO: 1 /HPF (ref 0–5)
ETHANOLAMINE SERPL-MCNC: NORMAL MG/DL (ref 0–0.08)
ETHANOLAMINE SERPL-MCNC: NORMAL MG/DL (ref 0–0.08)
FENTANYL SCREEN, URINE: NORMAL
FENTANYL SCREEN, URINE: NORMAL
FLUAV RNA RESP QL NAA+PROBE: NOT DETECTED
FLUAV RNA RESP QL NAA+PROBE: NOT DETECTED
FLUBV RNA RESP QL NAA+PROBE: NOT DETECTED
FLUBV RNA RESP QL NAA+PROBE: NOT DETECTED
GFR SERPLBLD CREATININE-BSD FMLA CKD-EPI: 58 ML/MIN/{1.73_M2}
GFR SERPLBLD CREATININE-BSD FMLA CKD-EPI: 58 ML/MIN/{1.73_M2}
GLUCOSE SERPL-MCNC: 94 MG/DL (ref 70–99)
GLUCOSE SERPL-MCNC: 94 MG/DL (ref 70–99)
GLUCOSE UR STRIP.AUTO-MCNC: NEGATIVE MG/DL
GLUCOSE UR STRIP.AUTO-MCNC: NEGATIVE MG/DL
HCT VFR BLD AUTO: 38.6 % (ref 36–48)
HCT VFR BLD AUTO: 38.6 % (ref 36–48)
HGB BLD-MCNC: 12.9 G/DL (ref 12–16)
HGB BLD-MCNC: 12.9 G/DL (ref 12–16)
HGB UR QL STRIP.AUTO: ABNORMAL
HGB UR QL STRIP.AUTO: ABNORMAL
HYALINE CASTS #/AREA URNS AUTO: 0 /LPF (ref 0–8)
HYALINE CASTS #/AREA URNS AUTO: 0 /LPF (ref 0–8)
KETONES UR STRIP.AUTO-MCNC: NEGATIVE MG/DL
KETONES UR STRIP.AUTO-MCNC: NEGATIVE MG/DL
LEUKOCYTE ESTERASE UR QL STRIP.AUTO: NEGATIVE
LEUKOCYTE ESTERASE UR QL STRIP.AUTO: NEGATIVE
LYMPHOCYTES # BLD: 2 K/UL (ref 1–5.1)
LYMPHOCYTES # BLD: 2 K/UL (ref 1–5.1)
LYMPHOCYTES NFR BLD: 19.4 %
LYMPHOCYTES NFR BLD: 19.4 %
MCH RBC QN AUTO: 30.6 PG (ref 26–34)
MCH RBC QN AUTO: 30.6 PG (ref 26–34)
MCHC RBC AUTO-ENTMCNC: 33.4 G/DL (ref 31–36)
MCHC RBC AUTO-ENTMCNC: 33.4 G/DL (ref 31–36)
MCV RBC AUTO: 91.6 FL (ref 80–100)
MCV RBC AUTO: 91.6 FL (ref 80–100)
METHADONE UR QL SCN>300 NG/ML: NORMAL
METHADONE UR QL SCN>300 NG/ML: NORMAL
MONOCYTES # BLD: 0.6 K/UL (ref 0–1.3)
MONOCYTES # BLD: 0.6 K/UL (ref 0–1.3)
MONOCYTES NFR BLD: 5.9 %
MONOCYTES NFR BLD: 5.9 %
NEUTROPHILS # BLD: 7.6 K/UL (ref 1.7–7.7)
NEUTROPHILS # BLD: 7.6 K/UL (ref 1.7–7.7)
NEUTROPHILS NFR BLD: 72.5 %
NEUTROPHILS NFR BLD: 72.5 %
NITRITE UR QL STRIP.AUTO: NEGATIVE
NITRITE UR QL STRIP.AUTO: NEGATIVE
OPIATES UR QL SCN>300 NG/ML: NORMAL
OPIATES UR QL SCN>300 NG/ML: NORMAL
OXYCODONE UR QL SCN: NORMAL
OXYCODONE UR QL SCN: NORMAL
PCP UR QL SCN>25 NG/ML: NORMAL
PCP UR QL SCN>25 NG/ML: NORMAL
PH UR STRIP.AUTO: 6 [PH] (ref 5–8)
PH UR STRIP.AUTO: 6 [PH] (ref 5–8)
PH UR STRIP: 6 [PH]
PH UR STRIP: 6 [PH]
PLATELET # BLD AUTO: 259 K/UL (ref 135–450)
PLATELET # BLD AUTO: 259 K/UL (ref 135–450)
PMV BLD AUTO: 7.8 FL (ref 5–10.5)
PMV BLD AUTO: 7.8 FL (ref 5–10.5)
POTASSIUM SERPL-SCNC: 4.7 MMOL/L (ref 3.5–5.1)
POTASSIUM SERPL-SCNC: 4.7 MMOL/L (ref 3.5–5.1)
PROT UR STRIP.AUTO-MCNC: NEGATIVE MG/DL
PROT UR STRIP.AUTO-MCNC: NEGATIVE MG/DL
RBC # BLD AUTO: 4.22 M/UL (ref 4–5.2)
RBC # BLD AUTO: 4.22 M/UL (ref 4–5.2)
RBC CLUMPS #/AREA URNS AUTO: 37 /HPF (ref 0–4)
RBC CLUMPS #/AREA URNS AUTO: 37 /HPF (ref 0–4)
REASON FOR REJECTION: NORMAL
REASON FOR REJECTION: NORMAL
REJECTED TEST: NORMAL
REJECTED TEST: NORMAL
SALICYLATES SERPL-MCNC: <0.3 MG/DL (ref 15–30)
SALICYLATES SERPL-MCNC: <0.3 MG/DL (ref 15–30)
SARS-COV-2 RNA RESP QL NAA+PROBE: NOT DETECTED
SARS-COV-2 RNA RESP QL NAA+PROBE: NOT DETECTED
SODIUM SERPL-SCNC: 141 MMOL/L (ref 136–145)
SODIUM SERPL-SCNC: 141 MMOL/L (ref 136–145)
SP GR UR STRIP.AUTO: 1.02 (ref 1–1.03)
SP GR UR STRIP.AUTO: 1.02 (ref 1–1.03)
TROPONIN, HIGH SENSITIVITY: <6 NG/L (ref 0–14)
TROPONIN, HIGH SENSITIVITY: <6 NG/L (ref 0–14)
UA COMPLETE W REFLEX CULTURE PNL UR: ABNORMAL
UA COMPLETE W REFLEX CULTURE PNL UR: ABNORMAL
UA DIPSTICK W REFLEX MICRO PNL UR: YES
UA DIPSTICK W REFLEX MICRO PNL UR: YES
URN SPEC COLLECT METH UR: ABNORMAL
URN SPEC COLLECT METH UR: ABNORMAL
UROBILINOGEN UR STRIP-ACNC: 1 E.U./DL
UROBILINOGEN UR STRIP-ACNC: 1 E.U./DL
WBC # BLD AUTO: 10.5 K/UL (ref 4–11)
WBC # BLD AUTO: 10.5 K/UL (ref 4–11)
WBC #/AREA URNS AUTO: 1 /HPF (ref 0–5)
WBC #/AREA URNS AUTO: 1 /HPF (ref 0–5)

## 2024-05-31 PROCEDURE — 99285 EMERGENCY DEPT VISIT HI MDM: CPT

## 2024-05-31 PROCEDURE — 80307 DRUG TEST PRSMV CHEM ANLYZR: CPT

## 2024-05-31 PROCEDURE — 80048 BASIC METABOLIC PNL TOTAL CA: CPT

## 2024-05-31 PROCEDURE — 81001 URINALYSIS AUTO W/SCOPE: CPT

## 2024-05-31 PROCEDURE — 82077 ASSAY SPEC XCP UR&BREATH IA: CPT

## 2024-05-31 PROCEDURE — 84484 ASSAY OF TROPONIN QUANT: CPT

## 2024-05-31 PROCEDURE — 80179 DRUG ASSAY SALICYLATE: CPT

## 2024-05-31 PROCEDURE — 36415 COLL VENOUS BLD VENIPUNCTURE: CPT

## 2024-05-31 PROCEDURE — 85025 COMPLETE CBC W/AUTO DIFF WBC: CPT

## 2024-05-31 PROCEDURE — 93005 ELECTROCARDIOGRAM TRACING: CPT | Performed by: EMERGENCY MEDICINE

## 2024-05-31 PROCEDURE — 71045 X-RAY EXAM CHEST 1 VIEW: CPT

## 2024-05-31 PROCEDURE — 80143 DRUG ASSAY ACETAMINOPHEN: CPT

## 2024-05-31 PROCEDURE — 87636 SARSCOV2 & INF A&B AMP PRB: CPT

## 2024-05-31 RX ORDER — HALOPERIDOL 5 MG/ML
2.5 INJECTION INTRAMUSCULAR ONCE
Status: COMPLETED | OUTPATIENT
Start: 2024-05-31 | End: 2024-06-01

## 2024-05-31 ASSESSMENT — PAIN - FUNCTIONAL ASSESSMENT: PAIN_FUNCTIONAL_ASSESSMENT: NONE - DENIES PAIN

## 2024-06-01 VITALS
HEART RATE: 55 BPM | RESPIRATION RATE: 16 BRPM | BODY MASS INDEX: 29.53 KG/M2 | TEMPERATURE: 98.4 F | HEIGHT: 64 IN | OXYGEN SATURATION: 90 % | BODY MASS INDEX: 29.53 KG/M2 | TEMPERATURE: 98.4 F | OXYGEN SATURATION: 90 % | SYSTOLIC BLOOD PRESSURE: 146 MMHG | DIASTOLIC BLOOD PRESSURE: 56 MMHG | RESPIRATION RATE: 16 BRPM | WEIGHT: 173 LBS | HEART RATE: 55 BPM | SYSTOLIC BLOOD PRESSURE: 146 MMHG | DIASTOLIC BLOOD PRESSURE: 56 MMHG | WEIGHT: 173 LBS | HEIGHT: 64 IN

## 2024-06-01 LAB
EKG ATRIAL RATE: 56 BPM
EKG ATRIAL RATE: 56 BPM
EKG DIAGNOSIS: NORMAL
EKG DIAGNOSIS: NORMAL
EKG P AXIS: 55 DEGREES
EKG P AXIS: 55 DEGREES
EKG P-R INTERVAL: 148 MS
EKG P-R INTERVAL: 148 MS
EKG Q-T INTERVAL: 438 MS
EKG Q-T INTERVAL: 438 MS
EKG QRS DURATION: 76 MS
EKG QRS DURATION: 76 MS
EKG QTC CALCULATION (BAZETT): 422 MS
EKG QTC CALCULATION (BAZETT): 422 MS
EKG R AXIS: -2 DEGREES
EKG R AXIS: -2 DEGREES
EKG T AXIS: 77 DEGREES
EKG T AXIS: 77 DEGREES
EKG VENTRICULAR RATE: 56 BPM
EKG VENTRICULAR RATE: 56 BPM

## 2024-06-01 PROCEDURE — 93010 ELECTROCARDIOGRAM REPORT: CPT | Performed by: INTERNAL MEDICINE

## 2024-06-01 PROCEDURE — 6360000002 HC RX W HCPCS: Performed by: EMERGENCY MEDICINE

## 2024-06-01 PROCEDURE — 96374 THER/PROPH/DIAG INJ IV PUSH: CPT

## 2024-06-01 RX ADMIN — HALOPERIDOL LACTATE 2.5 MG: 5 INJECTION, SOLUTION INTRAMUSCULAR at 02:38

## 2024-06-01 NOTE — ED NOTES
Patient resting quietly in bed, no s/s distress noted.  Suicide precaution in place.   ZEV Rolon at bedside.  Patient in gown with ties cut off.  All belongings at security. Patients Room is free of all removable equipment and medical supplies and medical cords/cables removed.  Bedside cart removed.  Plan of care ongoing.      A safety risk assessment of the patient environment was conducted and the room was modified for safety. The room is free of all removed equipment, medical supplies, and articles that may pose a threat to the patient or others such as sharp items and needle boxes.  The patient call light was left in place due to the medical nature of the unit and the needs of the patient.  The patient was place in a room close to the nursing desk.  Fall risk precautions in place.  Bed locked and in lowest position with 2/2 bed rails up.  Call light within reach.

## 2024-06-01 NOTE — ED PROVIDER NOTES
K/uL    RBC 4.22 4.00 - 5.20 M/uL    Hemoglobin 12.9 12.0 - 16.0 g/dL    Hematocrit 38.6 36.0 - 48.0 %    MCV 91.6 80.0 - 100.0 fL    MCH 30.6 26.0 - 34.0 pg    MCHC 33.4 31.0 - 36.0 g/dL    RDW 14.6 12.4 - 15.4 %    Platelets 259 135 - 450 K/uL    MPV 7.8 5.0 - 10.5 fL    Neutrophils % 72.5 %    Lymphocytes % 19.4 %    Monocytes % 5.9 %    Eosinophils % 1.9 %    Basophils % 0.3 %    Neutrophils Absolute 7.6 1.7 - 7.7 K/uL    Lymphocytes Absolute 2.0 1.0 - 5.1 K/uL    Monocytes Absolute 0.6 0.0 - 1.3 K/uL    Eosinophils Absolute 0.2 0.0 - 0.6 K/uL    Basophils Absolute 0.0 0.0 - 0.2 K/uL   Urinalysis with Reflex to Culture    Specimen: Urine   Result Value Ref Range    Color, UA Yellow Straw/Yellow    Clarity, UA Clear Clear    Glucose, Ur Negative Negative mg/dL    Bilirubin, Urine Negative Negative    Ketones, Urine Negative Negative mg/dL    Specific Gravity, UA 1.025 1.005 - 1.030    Blood, Urine MODERATE (A) Negative    pH, Urine 6.0 5.0 - 8.0    Protein, UA Negative Negative mg/dL    Urobilinogen, Urine 1.0 <2.0 E.U./dL    Nitrite, Urine Negative Negative    Leukocyte Esterase, Urine Negative Negative    Microscopic Examination YES     Urine Type NotGiven     Urine Reflex to Culture Not Indicated    Urine Drug Screen   Result Value Ref Range    Amphetamine Screen, Ur Neg Negative <1000ng/mL    Barbiturate Screen, Ur Neg Negative <200 ng/mL    Benzodiazepine Screen, Urine Neg Negative <200 ng/mL    Cannabinoid Scrn, Ur Neg Negative <50 ng/mL    Cocaine Metabolite Screen, Urine Neg Negative <300 ng/mL    Opiate Screen, Urine Neg Negative <300 ng/mL    Phencyclidine (PCP), Screen, Urine Neg Negative <25 ng/mL    Methadone Screen, Urine Neg Negative <300 ng/mL    Oxycodone Urine Neg Negative <100 ng/ml    FENTANYL SCREEN, URINE Neg Negative <5 ng/mL    pH, Urine 6.0     Drug Screen Comment: see below    SPECIMEN REJECTION   Result Value Ref Range    Rejected Test bmp etoh     Reason for Rejection see below   dementia.  No hypoglycemia.  EKG without evidence of acute ischemia, no malignant dysrhythmia, nothing to suggest ACS.  Neuroexam nonfocal, no evidence of CVA/TIA.  Patient has received CT imaging of the head on 5/26/2024 as well as yesterday without acute finding, I do not anticipate any benefit from repeat CT imaging in the short interval.  No findings to suggest infection, prior UTI appears resolved.  Remainder of labs workup is reassuring, no evidence of endorgan dysfunction or clinically significant electrolyte derangement.  Toxicology screen is unrevealing.  Patient did arrive on psychiatric hold by her psychiatrist at the nursing home recommending inpatient hospitalization for aggressive behavior and hallucinations.  While patient does not overtly demonstrate these behaviors in the emergency department at this time, will plan to continue with this recommendation and transfer for psychiatric evaluation.  Patient is medically stable for transfer for psychiatric evaluation at this time.    During the patient's ED course, the patient was given:  Medications   haloperidol lactate (HALDOL) injection 2.5 mg (has no administration in time range)        Consults:  None        History obtained from: Patient, EMS, and Chart review (due to patient condition and lack of collateral history immediately available)    Pertinent social determinants of health: Mental disability (congenital or acquired, including dementia if applicable)    Chronic conditions potentially affecting care:  Dementia    Review of other records:  Inpatient notes from previous visit at this facility from 5/26/24 and Nursing home or ECF records    Reassessment:  See MDM for details of medications given and reassessment      I Dr. Bro am the primary clinician of record.          Final Impression  1. Agitation due to dementia (HCC)    2. Hallucinations        Blood pressure (!) 136/50, pulse 55, temperature 98.4 °F (36.9 °C), temperature source Oral,

## 2024-06-01 NOTE — ED NOTES
Suicide precaution in place.   at bedside, due to, suicidal ideations.  Patient in gown with ties cut off.  All belongings at security.  Board above gurney removed from room for safety.  Patients Room is free of all removable equipment and medical supplies and medical cords/cables removed.  Bedside cart removed.  Plan of care ongoing.      A search of the patient and patient environment was performed. Two staff members (including ) conducted a witnessed search of all belongings in the presence of the patient. All personal items including sharp objects, belts, ties, and ingestibles were removed and secured.        Belongings include  Pants, shirt, bra, shoes.
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
## 2024-06-25 PROBLEM — N39.0 UTI (URINARY TRACT INFECTION): Status: RESOLVED | Noted: 2024-05-26 | Resolved: 2024-06-25

## 2025-03-17 ENCOUNTER — APPOINTMENT (OUTPATIENT)
Dept: CT IMAGING | Age: 75
End: 2025-03-17
Payer: COMMERCIAL

## 2025-03-17 ENCOUNTER — APPOINTMENT (OUTPATIENT)
Dept: GENERAL RADIOLOGY | Age: 75
End: 2025-03-17
Payer: COMMERCIAL

## 2025-03-17 ENCOUNTER — HOSPITAL ENCOUNTER (INPATIENT)
Age: 75
LOS: 4 days | Discharge: NURSING, REHAB OR LONG-TERM CARE | End: 2025-03-21
Attending: STUDENT IN AN ORGANIZED HEALTH CARE EDUCATION/TRAINING PROGRAM | Admitting: STUDENT IN AN ORGANIZED HEALTH CARE EDUCATION/TRAINING PROGRAM
Payer: COMMERCIAL

## 2025-03-17 DIAGNOSIS — Z71.89 GOALS OF CARE, COUNSELING/DISCUSSION: ICD-10-CM

## 2025-03-17 DIAGNOSIS — F03.90 DEMENTIA, UNSPECIFIED DEMENTIA SEVERITY, UNSPECIFIED DEMENTIA TYPE, UNSPECIFIED WHETHER BEHAVIORAL, PSYCHOTIC, OR MOOD DISTURBANCE OR ANXIETY (HCC): ICD-10-CM

## 2025-03-17 DIAGNOSIS — R65.10 SIRS (SYSTEMIC INFLAMMATORY RESPONSE SYNDROME) (HCC): ICD-10-CM

## 2025-03-17 DIAGNOSIS — J18.9 PNEUMONIA DUE TO INFECTIOUS ORGANISM, UNSPECIFIED LATERALITY, UNSPECIFIED PART OF LUNG: ICD-10-CM

## 2025-03-17 DIAGNOSIS — G93.41 METABOLIC ENCEPHALOPATHY: Primary | ICD-10-CM

## 2025-03-17 LAB
ALBUMIN SERPL-MCNC: 3.9 G/DL (ref 3.4–5)
ALBUMIN/GLOB SERPL: 1 {RATIO} (ref 1.1–2.2)
ALP SERPL-CCNC: 60 U/L (ref 40–129)
ALT SERPL-CCNC: 19 U/L (ref 10–40)
ANION GAP SERPL CALCULATED.3IONS-SCNC: 13 MMOL/L (ref 3–16)
AST SERPL-CCNC: 49 U/L (ref 15–37)
BASE EXCESS BLDV CALC-SCNC: 2 MMOL/L (ref -3–3)
BASOPHILS # BLD: 0 K/UL (ref 0–0.2)
BASOPHILS NFR BLD: 0.2 %
BILIRUB SERPL-MCNC: 0.7 MG/DL (ref 0–1)
BUN SERPL-MCNC: 18 MG/DL (ref 7–20)
CALCIUM SERPL-MCNC: 9.7 MG/DL (ref 8.3–10.6)
CHLORIDE SERPL-SCNC: 104 MMOL/L (ref 99–110)
CO2 BLDV-SCNC: 67 MMOL/L
CO2 SERPL-SCNC: 25 MMOL/L (ref 21–32)
COHGB MFR BLDV: 3.2 % (ref 0–1.5)
CREAT SERPL-MCNC: 1.1 MG/DL (ref 0.6–1.2)
DEPRECATED RDW RBC AUTO: 14.4 % (ref 12.4–15.4)
DO-HGB MFR BLDV: 31 %
EOSINOPHIL # BLD: 0 K/UL (ref 0–0.6)
EOSINOPHIL NFR BLD: 0 %
FLUAV RNA RESP QL NAA+PROBE: NOT DETECTED
FLUBV RNA RESP QL NAA+PROBE: NOT DETECTED
GFR SERPLBLD CREATININE-BSD FMLA CKD-EPI: 52 ML/MIN/{1.73_M2}
GLUCOSE SERPL-MCNC: 109 MG/DL (ref 70–99)
HCO3 BLDV-SCNC: 28.5 MMOL/L (ref 23–29)
HCT VFR BLD AUTO: 42.6 % (ref 36–48)
HGB BLD-MCNC: 14.1 G/DL (ref 12–16)
INR PPP: 1.21 (ref 0.85–1.15)
LACTATE BLDV-SCNC: 2.6 MMOL/L (ref 0.4–1.9)
LACTATE BLDV-SCNC: 3.2 MMOL/L (ref 0.4–1.9)
LIPASE SERPL-CCNC: 17 U/L (ref 13–60)
LYMPHOCYTES # BLD: 2.6 K/UL (ref 1–5.1)
LYMPHOCYTES NFR BLD: 21.1 %
MCH RBC QN AUTO: 31 PG (ref 26–34)
MCHC RBC AUTO-ENTMCNC: 33.1 G/DL (ref 31–36)
MCV RBC AUTO: 93.6 FL (ref 80–100)
METHGB MFR BLDV: 0.7 %
MONOCYTES # BLD: 1 K/UL (ref 0–1.3)
MONOCYTES NFR BLD: 8.2 %
NEUTROPHILS # BLD: 8.8 K/UL (ref 1.7–7.7)
NEUTROPHILS NFR BLD: 70.5 %
NT-PROBNP SERPL-MCNC: 667 PG/ML (ref 0–449)
O2 CT VFR BLDV CALC: 13 VOL %
O2 THERAPY: ABNORMAL
PCO2 BLDV: 51.2 MMHG (ref 40–50)
PH BLDV: 7.35 [PH] (ref 7.35–7.45)
PLATELET # BLD AUTO: 163 K/UL (ref 135–450)
PMV BLD AUTO: 9.2 FL (ref 5–10.5)
PO2 BLDV: 37.7 MMHG (ref 25–40)
POTASSIUM SERPL-SCNC: 4.2 MMOL/L (ref 3.5–5.1)
PROCALCITONIN SERPL IA-MCNC: 0.66 NG/ML (ref 0–0.15)
PROT SERPL-MCNC: 7.8 G/DL (ref 6.4–8.2)
PROTHROMBIN TIME: 15.5 SEC (ref 11.9–14.9)
RBC # BLD AUTO: 4.55 M/UL (ref 4–5.2)
SAO2 % BLDV: 67 %
SARS-COV-2 RNA RESP QL NAA+PROBE: NOT DETECTED
SODIUM SERPL-SCNC: 142 MMOL/L (ref 136–145)
TROPONIN, HIGH SENSITIVITY: 9 NG/L (ref 0–14)
TROPONIN, HIGH SENSITIVITY: 9 NG/L (ref 0–14)
WBC # BLD AUTO: 12.6 K/UL (ref 4–11)

## 2025-03-17 PROCEDURE — 84145 PROCALCITONIN (PCT): CPT

## 2025-03-17 PROCEDURE — 83880 ASSAY OF NATRIURETIC PEPTIDE: CPT

## 2025-03-17 PROCEDURE — 87636 SARSCOV2 & INF A&B AMP PRB: CPT

## 2025-03-17 PROCEDURE — 84484 ASSAY OF TROPONIN QUANT: CPT

## 2025-03-17 PROCEDURE — 96375 TX/PRO/DX INJ NEW DRUG ADDON: CPT

## 2025-03-17 PROCEDURE — 96361 HYDRATE IV INFUSION ADD-ON: CPT

## 2025-03-17 PROCEDURE — 80053 COMPREHEN METABOLIC PANEL: CPT

## 2025-03-17 PROCEDURE — 83605 ASSAY OF LACTIC ACID: CPT

## 2025-03-17 PROCEDURE — 1200000000 HC SEMI PRIVATE

## 2025-03-17 PROCEDURE — 96374 THER/PROPH/DIAG INJ IV PUSH: CPT

## 2025-03-17 PROCEDURE — 71045 X-RAY EXAM CHEST 1 VIEW: CPT

## 2025-03-17 PROCEDURE — 70450 CT HEAD/BRAIN W/O DYE: CPT

## 2025-03-17 PROCEDURE — 2580000003 HC RX 258: Performed by: PHYSICIAN ASSISTANT

## 2025-03-17 PROCEDURE — 82803 BLOOD GASES ANY COMBINATION: CPT

## 2025-03-17 PROCEDURE — 93005 ELECTROCARDIOGRAM TRACING: CPT | Performed by: INTERNAL MEDICINE

## 2025-03-17 PROCEDURE — 99285 EMERGENCY DEPT VISIT HI MDM: CPT

## 2025-03-17 PROCEDURE — 85610 PROTHROMBIN TIME: CPT

## 2025-03-17 PROCEDURE — 6360000002 HC RX W HCPCS: Performed by: PHYSICIAN ASSISTANT

## 2025-03-17 PROCEDURE — 85025 COMPLETE CBC W/AUTO DIFF WBC: CPT

## 2025-03-17 PROCEDURE — 83690 ASSAY OF LIPASE: CPT

## 2025-03-17 PROCEDURE — 2500000003 HC RX 250 WO HCPCS: Performed by: PHYSICIAN ASSISTANT

## 2025-03-17 RX ORDER — 0.9 % SODIUM CHLORIDE 0.9 %
500 INTRAVENOUS SOLUTION INTRAVENOUS ONCE
Status: COMPLETED | OUTPATIENT
Start: 2025-03-17 | End: 2025-03-17

## 2025-03-17 RX ADMIN — AZITHROMYCIN MONOHYDRATE 500 MG: 500 INJECTION, POWDER, LYOPHILIZED, FOR SOLUTION INTRAVENOUS at 22:46

## 2025-03-17 RX ADMIN — WATER 1000 MG: 1 INJECTION INTRAMUSCULAR; INTRAVENOUS; SUBCUTANEOUS at 22:44

## 2025-03-17 RX ADMIN — SODIUM CHLORIDE 500 ML: 0.9 INJECTION, SOLUTION INTRAVENOUS at 21:44

## 2025-03-17 ASSESSMENT — PAIN - FUNCTIONAL ASSESSMENT: PAIN_FUNCTIONAL_ASSESSMENT: NONE - DENIES PAIN

## 2025-03-17 ASSESSMENT — LIFESTYLE VARIABLES
HOW MANY STANDARD DRINKS CONTAINING ALCOHOL DO YOU HAVE ON A TYPICAL DAY: PATIENT DOES NOT DRINK
HOW OFTEN DO YOU HAVE A DRINK CONTAINING ALCOHOL: NEVER

## 2025-03-18 ENCOUNTER — APPOINTMENT (OUTPATIENT)
Dept: GENERAL RADIOLOGY | Age: 75
End: 2025-03-18
Payer: COMMERCIAL

## 2025-03-18 LAB
BACTERIA UR CULT: NORMAL
BACTERIA URNS QL MICRO: ABNORMAL /HPF
BILIRUB UR QL STRIP.AUTO: ABNORMAL
CLARITY UR: CLEAR
COLOR UR: ABNORMAL
EKG ATRIAL RATE: 75 BPM
EKG ATRIAL RATE: 75 BPM
EKG DIAGNOSIS: NORMAL
EKG DIAGNOSIS: NORMAL
EKG P AXIS: 57 DEGREES
EKG P AXIS: 57 DEGREES
EKG P-R INTERVAL: 128 MS
EKG P-R INTERVAL: 128 MS
EKG Q-T INTERVAL: 398 MS
EKG Q-T INTERVAL: 398 MS
EKG QRS DURATION: 74 MS
EKG QRS DURATION: 74 MS
EKG QTC CALCULATION (BAZETT): 444 MS
EKG QTC CALCULATION (BAZETT): 444 MS
EKG R AXIS: -3 DEGREES
EKG R AXIS: -3 DEGREES
EKG T AXIS: 21 DEGREES
EKG T AXIS: 21 DEGREES
EKG VENTRICULAR RATE: 75 BPM
EKG VENTRICULAR RATE: 75 BPM
EPI CELLS #/AREA URNS AUTO: 3 /HPF (ref 0–5)
GLUCOSE UR STRIP.AUTO-MCNC: NEGATIVE MG/DL
HGB UR QL STRIP.AUTO: ABNORMAL
HYALINE CASTS #/AREA URNS AUTO: 0 /LPF (ref 0–8)
KETONES UR STRIP.AUTO-MCNC: ABNORMAL MG/DL
LEUKOCYTE ESTERASE UR QL STRIP.AUTO: ABNORMAL
NITRITE UR QL STRIP.AUTO: POSITIVE
PH UR STRIP.AUTO: 6 [PH] (ref 5–8)
PROT UR STRIP.AUTO-MCNC: 30 MG/DL
RBC CLUMPS #/AREA URNS AUTO: 10 /HPF (ref 0–4)
SP GR UR STRIP.AUTO: 1.02 (ref 1–1.03)
UA COMPLETE W REFLEX CULTURE PNL UR: YES
UA DIPSTICK W REFLEX MICRO PNL UR: YES
URN SPEC COLLECT METH UR: ABNORMAL
UROBILINOGEN UR STRIP-ACNC: >=8 E.U./DL
WBC #/AREA URNS AUTO: 69 /HPF (ref 0–5)

## 2025-03-18 PROCEDURE — 6370000000 HC RX 637 (ALT 250 FOR IP): Performed by: INTERNAL MEDICINE

## 2025-03-18 PROCEDURE — 94150 VITAL CAPACITY TEST: CPT

## 2025-03-18 PROCEDURE — 1200000000 HC SEMI PRIVATE

## 2025-03-18 PROCEDURE — 87449 NOS EACH ORGANISM AG IA: CPT

## 2025-03-18 PROCEDURE — 94761 N-INVAS EAR/PLS OXIMETRY MLT: CPT

## 2025-03-18 PROCEDURE — 74018 RADEX ABDOMEN 1 VIEW: CPT

## 2025-03-18 PROCEDURE — 93010 ELECTROCARDIOGRAM REPORT: CPT | Performed by: INTERNAL MEDICINE

## 2025-03-18 PROCEDURE — 2700000000 HC OXYGEN THERAPY PER DAY

## 2025-03-18 PROCEDURE — 2500000003 HC RX 250 WO HCPCS: Performed by: PHYSICIAN ASSISTANT

## 2025-03-18 PROCEDURE — 2580000003 HC RX 258: Performed by: PHYSICIAN ASSISTANT

## 2025-03-18 PROCEDURE — 6360000002 HC RX W HCPCS: Performed by: PHYSICIAN ASSISTANT

## 2025-03-18 PROCEDURE — 87086 URINE CULTURE/COLONY COUNT: CPT

## 2025-03-18 PROCEDURE — 81001 URINALYSIS AUTO W/SCOPE: CPT

## 2025-03-18 PROCEDURE — 94640 AIRWAY INHALATION TREATMENT: CPT

## 2025-03-18 PROCEDURE — 6370000000 HC RX 637 (ALT 250 FOR IP): Performed by: PHYSICIAN ASSISTANT

## 2025-03-18 PROCEDURE — 94669 MECHANICAL CHEST WALL OSCILL: CPT

## 2025-03-18 RX ORDER — VALPROIC ACID 250 MG/5ML
250 SOLUTION ORAL
Status: DISCONTINUED | OUTPATIENT
Start: 2025-03-18 | End: 2025-03-21 | Stop reason: HOSPADM

## 2025-03-18 RX ORDER — LEVETIRACETAM 500 MG/1
500 TABLET ORAL 2 TIMES DAILY
Status: DISCONTINUED | OUTPATIENT
Start: 2025-03-18 | End: 2025-03-21 | Stop reason: HOSPADM

## 2025-03-18 RX ORDER — ALBUTEROL SULFATE 0.83 MG/ML
2.5 SOLUTION RESPIRATORY (INHALATION)
Status: DISCONTINUED | OUTPATIENT
Start: 2025-03-18 | End: 2025-03-21 | Stop reason: HOSPADM

## 2025-03-18 RX ORDER — ACETAMINOPHEN 325 MG/1
650 TABLET ORAL EVERY 6 HOURS PRN
Status: DISCONTINUED | OUTPATIENT
Start: 2025-03-18 | End: 2025-03-21 | Stop reason: HOSPADM

## 2025-03-18 RX ORDER — DIVALPROEX SODIUM 500 MG/1
500 TABLET, FILM COATED, EXTENDED RELEASE ORAL 2 TIMES DAILY
COMMUNITY

## 2025-03-18 RX ORDER — SODIUM CHLORIDE 9 MG/ML
INJECTION, SOLUTION INTRAVENOUS CONTINUOUS
Status: ACTIVE | OUTPATIENT
Start: 2025-03-18 | End: 2025-03-19

## 2025-03-18 RX ORDER — ATORVASTATIN CALCIUM 10 MG/1
10 TABLET, FILM COATED ORAL NIGHTLY
Status: DISCONTINUED | OUTPATIENT
Start: 2025-03-18 | End: 2025-03-21 | Stop reason: HOSPADM

## 2025-03-18 RX ORDER — SODIUM CHLORIDE 9 MG/ML
INJECTION, SOLUTION INTRAVENOUS PRN
Status: DISCONTINUED | OUTPATIENT
Start: 2025-03-18 | End: 2025-03-21 | Stop reason: HOSPADM

## 2025-03-18 RX ORDER — ACETAMINOPHEN 650 MG/1
650 SUPPOSITORY RECTAL EVERY 6 HOURS PRN
Status: DISCONTINUED | OUTPATIENT
Start: 2025-03-18 | End: 2025-03-21 | Stop reason: HOSPADM

## 2025-03-18 RX ORDER — VALPROIC ACID 250 MG/1
250 CAPSULE ORAL
Status: DISCONTINUED | OUTPATIENT
Start: 2025-03-18 | End: 2025-03-18

## 2025-03-18 RX ORDER — ASPIRIN 81 MG/1
81 TABLET ORAL DAILY
Status: DISCONTINUED | OUTPATIENT
Start: 2025-03-18 | End: 2025-03-21 | Stop reason: HOSPADM

## 2025-03-18 RX ORDER — AMLODIPINE BESYLATE 5 MG/1
10 TABLET ORAL DAILY
Status: DISCONTINUED | OUTPATIENT
Start: 2025-03-18 | End: 2025-03-21 | Stop reason: HOSPADM

## 2025-03-18 RX ORDER — SENNOSIDES A AND B 8.6 MG/1
1 TABLET, FILM COATED ORAL DAILY PRN
Status: DISCONTINUED | OUTPATIENT
Start: 2025-03-18 | End: 2025-03-21 | Stop reason: HOSPADM

## 2025-03-18 RX ORDER — MEMANTINE HYDROCHLORIDE 5 MG/1
7 TABLET ORAL NIGHTLY
COMMUNITY

## 2025-03-18 RX ORDER — BUSPIRONE HYDROCHLORIDE 5 MG/1
15 TABLET ORAL 3 TIMES DAILY
Status: DISCONTINUED | OUTPATIENT
Start: 2025-03-19 | End: 2025-03-21 | Stop reason: HOSPADM

## 2025-03-18 RX ORDER — BUSPIRONE HYDROCHLORIDE 15 MG/1
15 TABLET ORAL 3 TIMES DAILY
COMMUNITY

## 2025-03-18 RX ORDER — SODIUM CHLORIDE 0.9 % (FLUSH) 0.9 %
10 SYRINGE (ML) INJECTION PRN
Status: DISCONTINUED | OUTPATIENT
Start: 2025-03-18 | End: 2025-03-21 | Stop reason: HOSPADM

## 2025-03-18 RX ORDER — DIVALPROEX SODIUM 500 MG/1
500 TABLET, FILM COATED, EXTENDED RELEASE ORAL 2 TIMES DAILY
Status: DISCONTINUED | OUTPATIENT
Start: 2025-03-19 | End: 2025-03-18

## 2025-03-18 RX ORDER — QUETIAPINE FUMARATE 25 MG/1
25 TABLET, FILM COATED ORAL NIGHTLY
Status: DISCONTINUED | OUTPATIENT
Start: 2025-03-18 | End: 2025-03-18

## 2025-03-18 RX ORDER — VALPROIC ACID 250 MG/1
250 CAPSULE ORAL
Status: DISCONTINUED | OUTPATIENT
Start: 2025-03-19 | End: 2025-03-18

## 2025-03-18 RX ORDER — VALPROIC ACID 250 MG/1
250 CAPSULE ORAL
COMMUNITY

## 2025-03-18 RX ORDER — ENOXAPARIN SODIUM 100 MG/ML
40 INJECTION SUBCUTANEOUS DAILY
Status: DISCONTINUED | OUTPATIENT
Start: 2025-03-18 | End: 2025-03-21 | Stop reason: HOSPADM

## 2025-03-18 RX ORDER — ONDANSETRON 2 MG/ML
4 INJECTION INTRAMUSCULAR; INTRAVENOUS EVERY 6 HOURS PRN
Status: DISCONTINUED | OUTPATIENT
Start: 2025-03-18 | End: 2025-03-21 | Stop reason: HOSPADM

## 2025-03-18 RX ORDER — SODIUM CHLORIDE 0.9 % (FLUSH) 0.9 %
5-40 SYRINGE (ML) INJECTION EVERY 12 HOURS SCHEDULED
Status: DISCONTINUED | OUTPATIENT
Start: 2025-03-18 | End: 2025-03-21 | Stop reason: HOSPADM

## 2025-03-18 RX ORDER — POTASSIUM CHLORIDE 750 MG/1
10 TABLET, EXTENDED RELEASE ORAL DAILY
COMMUNITY

## 2025-03-18 RX ORDER — LISINOPRIL 10 MG/1
10 TABLET ORAL DAILY
Status: DISCONTINUED | OUTPATIENT
Start: 2025-03-18 | End: 2025-03-21 | Stop reason: HOSPADM

## 2025-03-18 RX ORDER — DIVALPROEX SODIUM 500 MG/1
500 TABLET, FILM COATED, EXTENDED RELEASE ORAL 2 TIMES DAILY
Status: DISCONTINUED | OUTPATIENT
Start: 2025-03-18 | End: 2025-03-20 | Stop reason: SDUPTHER

## 2025-03-18 RX ORDER — IPRATROPIUM BROMIDE AND ALBUTEROL SULFATE 2.5; .5 MG/3ML; MG/3ML
1 SOLUTION RESPIRATORY (INHALATION)
Status: DISCONTINUED | OUTPATIENT
Start: 2025-03-18 | End: 2025-03-21

## 2025-03-18 RX ORDER — LACTOBACILLUS RHAMNOSUS GG 10B CELL
1 CAPSULE ORAL 2 TIMES DAILY WITH MEALS
Status: DISCONTINUED | OUTPATIENT
Start: 2025-03-18 | End: 2025-03-21 | Stop reason: HOSPADM

## 2025-03-18 RX ADMIN — MELATONIN TAB 3 MG 6 MG: 3 TAB at 22:48

## 2025-03-18 RX ADMIN — AZITHROMYCIN MONOHYDRATE 500 MG: 500 INJECTION, POWDER, LYOPHILIZED, FOR SOLUTION INTRAVENOUS at 23:21

## 2025-03-18 RX ADMIN — DIVALPROEX SODIUM 500 MG: 500 TABLET, EXTENDED RELEASE ORAL at 22:49

## 2025-03-18 RX ADMIN — ATORVASTATIN CALCIUM 10 MG: 10 TABLET, FILM COATED ORAL at 22:49

## 2025-03-18 RX ADMIN — IPRATROPIUM BROMIDE AND ALBUTEROL SULFATE 1 DOSE: .5; 3 SOLUTION RESPIRATORY (INHALATION) at 12:10

## 2025-03-18 RX ADMIN — LISINOPRIL 10 MG: 10 TABLET ORAL at 08:58

## 2025-03-18 RX ADMIN — VALPROIC ACID 250 MG: 250 SOLUTION ORAL at 16:19

## 2025-03-18 RX ADMIN — ENOXAPARIN SODIUM 40 MG: 100 INJECTION SUBCUTANEOUS at 09:10

## 2025-03-18 RX ADMIN — Medication 10 ML: at 23:22

## 2025-03-18 RX ADMIN — ASPIRIN 81 MG: 81 TABLET, COATED ORAL at 08:58

## 2025-03-18 RX ADMIN — Medication 1 CAPSULE: at 16:19

## 2025-03-18 RX ADMIN — IPRATROPIUM BROMIDE AND ALBUTEROL SULFATE 1 DOSE: .5; 3 SOLUTION RESPIRATORY (INHALATION) at 21:19

## 2025-03-18 RX ADMIN — SERTRALINE 100 MG: 50 TABLET, FILM COATED ORAL at 08:58

## 2025-03-18 RX ADMIN — WATER 1000 MG: 1 INJECTION INTRAMUSCULAR; INTRAVENOUS; SUBCUTANEOUS at 22:49

## 2025-03-18 RX ADMIN — Medication 1 CAPSULE: at 08:58

## 2025-03-18 RX ADMIN — LEVETIRACETAM 500 MG: 500 TABLET, FILM COATED ORAL at 08:58

## 2025-03-18 RX ADMIN — SODIUM CHLORIDE: 0.9 INJECTION, SOLUTION INTRAVENOUS at 05:16

## 2025-03-18 RX ADMIN — LEVETIRACETAM 500 MG: 500 TABLET, FILM COATED ORAL at 22:48

## 2025-03-18 NOTE — PLAN OF CARE
Problem: Discharge Planning  Goal: Discharge to home or other facility with appropriate resources  3/18/2025 0952 by Consuelo Ye RN  Outcome: Progressing  3/18/2025 0209 by Selina Hernandez RN  Outcome: Progressing     Problem: Safety - Adult  Goal: Free from fall injury  3/18/2025 0952 by Consuelo Ye RN  Outcome: Progressing  3/18/2025 0209 by Selina Hernandez RN  Outcome: Progressing     Problem: Skin/Tissue Integrity  Goal: Skin integrity remains intact  Description: 1.  Monitor for areas of redness and/or skin breakdown  2.  Assess vascular access sites hourly  3.  Every 4-6 hours minimum:  Change oxygen saturation probe site  4.  Every 4-6 hours:  If on nasal continuous positive airway pressure, respiratory therapy assess nares and determine need for appliance change or resting period  3/18/2025 0952 by Consuelo Ye RN  Outcome: Progressing  3/18/2025 0209 by Selina Hernandez RN  Outcome: Progressing     Problem: ABCDS Injury Assessment  Goal: Absence of physical injury  3/18/2025 0209 by Selina Hernandez RN  Outcome: Progressing

## 2025-03-18 NOTE — ED NOTES
Digna Baxter is a 75 y.o. female admitted for  Principal Problem:    Pneumonia, unspecified organism  Resolved Problems:    * No resolved hospital problems. *  .   Patient SNF LTC via EMS transportation with   Chief Complaint   Patient presents with    Altered Mental Status     Pt brought by EMS from [facility]. Rns stated that she hasn't eatene since Saturday. Pt daughter landed to and stated that she isnt normally like this very altered, went to the bathroom on herself. Pt hx of dementia.    .  Patient is alert and Disoriented   Patient's baseline mobility: Baseline Mobility: Bed bound   Code Status: Prior   Cardiac Rhythm:       Is patient on baseline Oxygen: no how many Liters:   Abnormal Assessment Findings: pt admitted for encephalopathy, dementia, PNA. Lactic was elevated, WBC elevated.    Isolation: None      NIH Score:    C-SSRS: Risk of Suicide: No Risk  Bedside swallow:        Active LDA's:   Peripheral IV 03/17/25 Left;Posterior Hand (Active)   Site Assessment Clean, dry & intact 03/17/25 2030       Peripheral IV 03/17/25 Distal;Left;Posterior Forearm (Active)   Site Assessment Clean, dry & intact 03/17/25 2113     Patient admitted with a marshall: no If the marshall is chronic was it exchanged:  Reason for marshall:   Patient admitted with Central Line:  . PICC line placement confirmed: YES OR NO:[id number]}   Reason for Central line:   Was central line Inserted from an outside facility:        Family/Caregiver Present no Any Concerns: no   Restraints no  Sitter no         Vitals:      Vitals:    03/17/25 2110 03/17/25 2115 03/17/25 2245 03/17/25 2330   BP: 135/80 (!) 180/67 (!) 156/62 (!) 160/53   Pulse: 76 78 71 67   Resp: 21 21 22 (!) 32   Temp:       TempSrc:       SpO2: 97% 96% 97% 95%   Weight:       Height:           Last documented pain score (0-10 scale)    Pain medication administered No.    Pertinent or High Risk Medications/Drips: No.    Pending Blood Product Administration: no    Abnormal labs:

## 2025-03-18 NOTE — PROGRESS NOTES
4 Eyes Skin Assessment     NAME:  Digna Baxter  YOB: 1950  MEDICAL RECORD NUMBER:  [id number]    The patient is being assessed for  Admission    I agree that at least one RN has performed a thorough Head to Toe Skin Assessment on the patient. ALL assessment sites listed below have been assessed.      Areas assessed by both nurses:    Head, Face, Ears, Shoulders, Back, Chest, Arms, Elbows, Hands, Sacrum. Buttock, Coccyx, Ischium, Legs. Feet and Heels, and Under Medical Devices         Does the Patient have a Wound? No noted wound(s)       Jun Prevention initiated by RN: No  Wound Care Orders initiated by RN: No    Pressure Injury (Stage 3,4, Unstageable, DTI, NWPT, and Complex wounds) if present, place Wound referral order by RN under : No    New Ostomies, if present place, Ostomy referral order under : No     Nurse 1 eSignature: Electronically signed by Selina Hernandez RN on 3/18/25 at 2:17 AM EDT    **SHARE this note so that the co-signing nurse can place an eSignature**    Nurse 2 eSignature: Electronically signed by Kristine Liao RN on 3/18/25 at 3:42 AM EDT

## 2025-03-18 NOTE — PROGRESS NOTES
[facility] admit [facility] with c/o of acute distress and refusing meal since 3/15. Diagnosed with Pneumonia. She's alert  X 1 but confused and agitated. On oxygen at 2 L/ min SPO2 95%. She walks baseline. Takes her meds whole but can be crushed sometimes and mixed in pudding if not contraindicated. Responds to command. Urine sample collected for ELSIE. Urine has a foul smell and its hazy. Lung sounds are diminished on the lower lobes. She is calm in bed DEANGELO. Will continue to monitor. Call light within reach    4:30am patient is confused, agitated, combative, and refused her medications. Refused O2 therapy Refused temperature check

## 2025-03-18 NOTE — ED PROVIDER NOTES
sepsis criteria.  Lactate is pending.    2110 - Patient now meets criteria for sepsis, but does not meet criteria for severe sepsis or septic shock.  She is normotensive and does not require 30cc/kg at this time.  Fluid bolus was not administered due to concern for fluid overload.  Antibiotics and blood cultures have already been ordered.     2129-lactate 3.2.  Respiratory rate 21 with a white count of 12.6.  Patient remains hypertensive without fluid requirement.    Patient seems a little more alert on reevaluation.  She remains hemodynamically stable, and is minimally tachypneic.  While sleeping she did require oxygen, but she seems to be saturating normally while awake.  She is not really following commands, although this seems to partially be behavioral.  Low suspicion for CVA although cannot fully exclude.  Suspect altered mental status is more related to pneumonia.  Regardless, patient is outside the window for any emergent intervention, so stroke alert not initiated.  Patient will be admitted for further evaluation/management of encephalopathy and IV antibiotics for pneumonia.  Concern for possible early sepsis, although she does not meet criteria for severe sepsis or septic shock.  Patient accepted by hospitalist, and admitted in stable condition.    Disposition Considerations (tests considered but not done, Admit vs D/C, Shared Decision Making, Pt Expectation of Test or Tx.):        I am the Primary Clinician of Record.  FINAL IMPRESSION      1. Metabolic encephalopathy    2. Pneumonia due to infectious organism, unspecified laterality, unspecified part of lung    3. Dementia, unspecified dementia severity, unspecified dementia type, unspecified whether behavioral, psychotic, or mood disturbance or anxiety (HCC)    4. SIRS (systemic inflammatory response syndrome) (HCC)    5. Goals of care, counseling/discussion          DISPOSITION/PLAN     DISPOSITION Admitted 03/17/2025 11:16:30 PM               PATIENT  REFERRED TO:  No follow-up provider specified.    DISCHARGE MEDICATIONS:  New Prescriptions    No medications on file       DISCONTINUED MEDICATIONS:  Discontinued Medications    No medications on file              (Please note that portions of this note were completed with a voice recognition program.  Efforts were made to edit the dictations but occasionally words are mis-transcribed.)    MICKEY Allen (electronically signed)        Anyi Garza PA  03/18/25 0032

## 2025-03-18 NOTE — PROGRESS NOTES
0740:Pt refused for lab draw drawn.     0857: Shift assessment done, VSS, Pt is Confused and alert to self only. Pt follow commands, Pt took her pills crushed with apple sauce fine with this nurse. Denies pain at this time. AM Meds given per MAR. Standard safety measures in place. The care plan and education has been reviewed and mutually agreed upon with the patient. Call lights within reach.     0921: Pt's daughter Nargis called for an updates. This nurse updated pt's plan of care to the daughter and questions were answered.     1255: Pt refused for lab drawn twice today. LAB stated, \"not doing any labs today\". MD notified.     1442: Pt Appeared Combative and Aggressive, Notified doc af following \" Sara Herrmann Pt appeared to be combative and noncompliance with eveything. Pt doesn't follow commands and verbally aggressive. Pt takes following meds at home: Buspirone 15mg TID, depakote 500mg BID in morning and Valproic acid 250mg with lunch. Can pt also get somthing that could calm her down? Thank you   \"  Order placed.

## 2025-03-18 NOTE — H&P
Hospital Medicine History & Physical        Name:  Digna Baxter /Age/Sex: 1950  (75 y.o. female)   MRN & CSN:  [id number] & [id number] Encounter Date/Time: 3/17/2025 11:16 PM EDT   Location:  [facility] PCP: Diego Dye MD         CHIEF COMPLAINT:   Chief Complaint   Patient presents with    Altered Mental Status     Pt brought by EMS from [facility]. Rns stated that she hasn't eatene since Saturday. Pt daughter landed to and stated that she isnt normally like this very altered, went to the bathroom on herself. Pt hx of dementia.          HISTORY OF PRESENT ILLNESS:      History from: EMR  Limitations to history : Altered Mental Status     Digna Baxter is a 75 y.o. female who presented to ED for evaluation of altered mental status.  Patient has a history of advanced dementia.  Patient is a resident at [facility] and patient's daughter lives in [facility] but visits every 3 to 4 weeks.  No family currently at bedside.  Per report patient's daughter last saw her mom at the end of February and while she does have a history of dementia she is normally able to ambulate and normally recognize her daughter.  Daughter reported that on arrival today her mom seemed more confused than normal and did not recognize her.  Facility also told patient's daughter that patient has not really eaten for the last 2 days.  Patient's daughter requested patient be seen in the ED for evaluation.  Patient's daughter denied any other known complaints or concerns.  Patient denies any complaints at this time.  No additional history able to be obtained.      REVIEW OF SYSTEMS:   Unable to perform ROS due to AMS    PHYSICAL EXAM PERFORMED:  BP (!) 156/62   Pulse 71   Temp 99.7 °F (37.6 °C) (Oral)   Resp 22   Ht 1.626 m (5' 4\")   Wt 77.5 kg (170 lb 13.7 oz)   SpO2 97%   BMI 29.33 kg/m²     General appearance:  Awake, alert, no apparent distress  HEENT:  Normocephalic, atraumatic without  8:39 pm COMPARISON: Chest 05/31/2024 HISTORY: Fever, AMS FINDINGS: The cardiac silhouette is enlarged.  Calcifications involving the aorta reflect atherosclerosis. The mediastinal and hilar silhouettes appear unremarkable. Scattered pulmonary opacities bilateral mid and lower lungs.  Low lung volumes.  No pleural effusion evident. No pneumothorax is seen. No acute osseous abnormality is identified.     1. Nonspecific pulmonary infiltrates are commonly seen with atypical/viral pneumonia. 2. Calcific atherosclerosis aorta. 3. Cardiomegaly.       Total time spent caring for the patient today was 75 minutes. This includes time spent before the visit reviewing the chart, time spent during the visit, and time spent after the visit on documentation.    (Please note that portions of this note were completed with a voice recognition program.  Efforts were made to edit the dictations but occasionally words are mis-transcribed.)     Lucy Jeffries PA-C

## 2025-03-18 NOTE — CARE COORDINATION
Discharge Planning Note:    Chart reviewed and it appears that patient has minimal needs for discharge at this time. Risk Score 11 %     Primary Care Physician is TREVIN GRIGSBY   Primary insurance is [facility] Medicare, [facility] Medicaid    Patient is from [facility].  Patient is LTC- patient will need a pre-cert to return.    Please notify case management if any discharge needs are identified.      Case management will continue to follow progress and update discharge plan as needed.      CM team following.    Electronically signed by SHIVA Stallworth on 3/18/2025 at 9:56 AM

## 2025-03-18 NOTE — ED NOTES
Lab aware of need to draw. Multiple RN attempted to get blood and IV access by ultrasound. IV obtained but add on blood work unsuccessful.

## 2025-03-18 NOTE — PROGRESS NOTES
03/18/25 0142   RT Protocol   History Pulmonary Disease 2   Respiratory pattern 0   Breath sounds 2   Cough 0   Indications for Bronchodilator Therapy On home bronchodilators   Bronchodilator Assessment Score 4

## 2025-03-19 LAB
ANION GAP SERPL CALCULATED.3IONS-SCNC: 12 MMOL/L (ref 3–16)
BASOPHILS # BLD: 0 K/UL (ref 0–0.2)
BASOPHILS NFR BLD: 0.4 %
BUN SERPL-MCNC: 11 MG/DL (ref 7–20)
CALCIUM SERPL-MCNC: 9 MG/DL (ref 8.3–10.6)
CHLORIDE SERPL-SCNC: 107 MMOL/L (ref 99–110)
CO2 SERPL-SCNC: 19 MMOL/L (ref 21–32)
CREAT SERPL-MCNC: 0.8 MG/DL (ref 0.6–1.2)
DEPRECATED RDW RBC AUTO: 13.4 % (ref 12.4–15.4)
EOSINOPHIL # BLD: 0.1 K/UL (ref 0–0.6)
EOSINOPHIL NFR BLD: 0.7 %
GFR SERPLBLD CREATININE-BSD FMLA CKD-EPI: 76 ML/MIN/{1.73_M2}
GLUCOSE SERPL-MCNC: 103 MG/DL (ref 70–99)
HCT VFR BLD AUTO: 35.7 % (ref 36–48)
HGB BLD-MCNC: 12.2 G/DL (ref 12–16)
LEGIONELLA AG UR QL: NORMAL
LYMPHOCYTES # BLD: 2.5 K/UL (ref 1–5.1)
LYMPHOCYTES NFR BLD: 27.7 %
MCH RBC QN AUTO: 31.4 PG (ref 26–34)
MCHC RBC AUTO-ENTMCNC: 34.1 G/DL (ref 31–36)
MCV RBC AUTO: 92.1 FL (ref 80–100)
MONOCYTES # BLD: 0.9 K/UL (ref 0–1.3)
MONOCYTES NFR BLD: 10.2 %
NEUTROPHILS # BLD: 5.5 K/UL (ref 1.7–7.7)
NEUTROPHILS NFR BLD: 61 %
PLATELET # BLD AUTO: 174 K/UL (ref 135–450)
PMV BLD AUTO: 9.5 FL (ref 5–10.5)
POTASSIUM SERPL-SCNC: 3.8 MMOL/L (ref 3.5–5.1)
RBC # BLD AUTO: 3.88 M/UL (ref 4–5.2)
REPORT: NORMAL
RESP PATH DNA+RNA PNL NPH NAA+NON-PROBE: NORMAL
S PNEUM AG UR QL: NORMAL
SODIUM SERPL-SCNC: 138 MMOL/L (ref 136–145)
WBC # BLD AUTO: 9.1 K/UL (ref 4–11)

## 2025-03-19 PROCEDURE — 87040 BLOOD CULTURE FOR BACTERIA: CPT

## 2025-03-19 PROCEDURE — 80048 BASIC METABOLIC PNL TOTAL CA: CPT

## 2025-03-19 PROCEDURE — 2500000003 HC RX 250 WO HCPCS: Performed by: INTERNAL MEDICINE

## 2025-03-19 PROCEDURE — 6360000002 HC RX W HCPCS: Performed by: INTERNAL MEDICINE

## 2025-03-19 PROCEDURE — 94640 AIRWAY INHALATION TREATMENT: CPT

## 2025-03-19 PROCEDURE — 36415 COLL VENOUS BLD VENIPUNCTURE: CPT

## 2025-03-19 PROCEDURE — 85025 COMPLETE CBC W/AUTO DIFF WBC: CPT

## 2025-03-19 PROCEDURE — 6360000002 HC RX W HCPCS: Performed by: PHYSICIAN ASSISTANT

## 2025-03-19 PROCEDURE — 6370000000 HC RX 637 (ALT 250 FOR IP): Performed by: PHYSICIAN ASSISTANT

## 2025-03-19 PROCEDURE — 2500000003 HC RX 250 WO HCPCS: Performed by: PHYSICIAN ASSISTANT

## 2025-03-19 PROCEDURE — 0202U NFCT DS 22 TRGT SARS-COV-2: CPT

## 2025-03-19 PROCEDURE — 94761 N-INVAS EAR/PLS OXIMETRY MLT: CPT

## 2025-03-19 PROCEDURE — 1200000000 HC SEMI PRIVATE

## 2025-03-19 PROCEDURE — 6370000000 HC RX 637 (ALT 250 FOR IP): Performed by: INTERNAL MEDICINE

## 2025-03-19 RX ADMIN — Medication 1 CAPSULE: at 09:34

## 2025-03-19 RX ADMIN — VALPROIC ACID 250 MG: 250 SOLUTION ORAL at 15:19

## 2025-03-19 RX ADMIN — ZIPRASIDONE MESYLATE 20 MG: 20 INJECTION, POWDER, LYOPHILIZED, FOR SOLUTION INTRAMUSCULAR at 11:11

## 2025-03-19 RX ADMIN — MELATONIN TAB 3 MG 6 MG: 3 TAB at 21:19

## 2025-03-19 RX ADMIN — Medication 10 ML: at 21:00

## 2025-03-19 RX ADMIN — IPRATROPIUM BROMIDE AND ALBUTEROL SULFATE 1 DOSE: .5; 3 SOLUTION RESPIRATORY (INHALATION) at 16:01

## 2025-03-19 RX ADMIN — Medication 1 CAPSULE: at 15:14

## 2025-03-19 RX ADMIN — IPRATROPIUM BROMIDE AND ALBUTEROL SULFATE 1 DOSE: .5; 3 SOLUTION RESPIRATORY (INHALATION) at 08:30

## 2025-03-19 RX ADMIN — IPRATROPIUM BROMIDE AND ALBUTEROL SULFATE 1 DOSE: .5; 3 SOLUTION RESPIRATORY (INHALATION) at 20:48

## 2025-03-19 RX ADMIN — ENOXAPARIN SODIUM 40 MG: 100 INJECTION SUBCUTANEOUS at 09:35

## 2025-03-19 RX ADMIN — DIVALPROEX SODIUM 500 MG: 500 TABLET, EXTENDED RELEASE ORAL at 21:19

## 2025-03-19 RX ADMIN — ASPIRIN 81 MG: 81 TABLET, COATED ORAL at 09:34

## 2025-03-19 RX ADMIN — IPRATROPIUM BROMIDE AND ALBUTEROL SULFATE 1 DOSE: .5; 3 SOLUTION RESPIRATORY (INHALATION) at 12:04

## 2025-03-19 RX ADMIN — BUSPIRONE HYDROCHLORIDE 15 MG: 5 TABLET ORAL at 09:34

## 2025-03-19 RX ADMIN — BUSPIRONE HYDROCHLORIDE 15 MG: 5 TABLET ORAL at 21:19

## 2025-03-19 RX ADMIN — Medication 10 ML: at 09:34

## 2025-03-19 RX ADMIN — WATER 1000 MG: 1 INJECTION INTRAMUSCULAR; INTRAVENOUS; SUBCUTANEOUS at 21:20

## 2025-03-19 RX ADMIN — LEVETIRACETAM 500 MG: 500 TABLET, FILM COATED ORAL at 09:34

## 2025-03-19 RX ADMIN — SERTRALINE 100 MG: 50 TABLET, FILM COATED ORAL at 09:34

## 2025-03-19 RX ADMIN — DIVALPROEX SODIUM 500 MG: 500 TABLET, EXTENDED RELEASE ORAL at 09:34

## 2025-03-19 RX ADMIN — BUSPIRONE HYDROCHLORIDE 15 MG: 5 TABLET ORAL at 15:14

## 2025-03-19 RX ADMIN — ATORVASTATIN CALCIUM 10 MG: 10 TABLET, FILM COATED ORAL at 21:19

## 2025-03-19 RX ADMIN — LISINOPRIL 10 MG: 10 TABLET ORAL at 09:34

## 2025-03-19 RX ADMIN — AMLODIPINE BESYLATE 10 MG: 5 TABLET ORAL at 09:34

## 2025-03-19 RX ADMIN — LEVETIRACETAM 500 MG: 500 TABLET, FILM COATED ORAL at 21:19

## 2025-03-19 NOTE — PROGRESS NOTES
Hospitalist Progress Note      PCP: Diego Dye MD    Date of Admission: 3/17/2025    Chief Complaint: AMS    Hospital Course: 74 yo F with dementia, HTN, seizure disorder was sent to ER from [facility] with AMS.  Admitted as inpatient for PNA with acute metabolic encephalopathy.  Started on IVF and IV Abx. Viral panel not detected.    Subjective:  Patient remains awake and severely demented.  Denies CP, SOB, HA or abdominal pain.  Coughinng.      Medications:  Reviewed    Infusion Medications    sodium chloride       Scheduled Medications    amLODIPine  10 mg Oral Daily    aspirin  81 mg Oral Daily    levETIRAcetam  500 mg Oral BID    lisinopril  10 mg Oral Daily    melatonin  6 mg Oral Nightly    sertraline  100 mg Oral Daily    atorvastatin  10 mg Oral Nightly    sodium chloride flush  5-40 mL IntraVENous 2 times per day    ipratropium 0.5 mg-albuterol 2.5 mg  1 Dose Inhalation Q4H WA RT    enoxaparin  40 mg SubCUTAneous Daily    cefTRIAXone (ROCEPHIN) IV  1,000 mg IntraVENous Q24H    And    azithromycin  500 mg IntraVENous Q24H    lactobacillus  1 capsule Oral BID WC    busPIRone  15 mg Oral TID    divalproex  500 mg Oral BID    valproic acid  250 mg Oral Lunch     PRN Meds: sodium chloride flush, sodium chloride, ondansetron, senna, acetaminophen **OR** acetaminophen, albuterol, ziprasidone (GEODON) 20 mg in sterile water 1 mL injection      Intake/Output Summary (Last 24 hours) at 3/19/2025 1451  Last data filed at 3/18/2025 1713  Gross per 24 hour   Intake --   Output 200 ml   Net -200 ml       Physical Exam Performed:    BP (!) 145/71   Pulse 65   Temp 98.1 °F (36.7 °C) (Axillary)   Resp 16   Ht 1.626 m (5' 4\")   Wt 77.5 kg (170 lb 13.7 oz)   SpO2 90%   BMI 29.33 kg/m²     General appearance: No apparent distress, appears stated age and cooperative.  HEENT: Pupils equal, round, and reactive to light. Conjunctivae/corneas clear.  Neck: Supple, with full range of motion. No

## 2025-03-19 NOTE — ACP (ADVANCE CARE PLANNING)
Advanced Care Planning Note.    Purpose of Encounter: Advanced care planning in light of dementia  Parties In Attendance: Patient, daughter Nargis on phone  Decisional Capacity: NO  Subjective: Patient is coughing  Objective: Cr 1.1 on 3/17  Goals of Care Determination: Patient/POA want full support (CPR, vent, surgery, HD, trach, PEG)  Plan:  IVF, IV Abx, SLP eval  Code Status: Full code   Time spent on Advanced care Plannin minutes  Advanced Care Planning Documents: Completed advanced directives on chart, mallika Barillas is the POA.    Tony Perea MD  3/18/2025 11:34 PM

## 2025-03-19 NOTE — CARE COORDINATION
Discharge Planning:     (CM) did speak to  at [facility].  is starting pre-cert today.    CM team following.    Electronically signed by SHIVA Stallworth on 3/19/2025 at 11:39 AM

## 2025-03-19 NOTE — CARE COORDINATION
03/19/25 0958   IMM Letter   IMM Letter given to Patient/Family/Significant other/Guardian/POA/by: Spoke with patient's daughter over the phone copy emailed to [email or web address]   IMM Letter date given: 03/19/25   IMM Letter time given: 0958

## 2025-03-19 NOTE — PROGRESS NOTES
Shift assessment completed. Alert and disoriented X 3. Patient refused nasal cannula and was agitated and combative, SPO2 88 %. Not in any obvious resp distress. Call light within reach. Sample taken for resp panel.    0400/sudden onset

## 2025-03-19 NOTE — PROGRESS NOTES
Pt disoriented.  Difficult to even administer nebulizer treatment as patient is confused as to purpose of treatment.  Pt completely unwilling / unable to do Incentive Spirometry and Acapella airway clearance device.

## 2025-03-19 NOTE — PLAN OF CARE
Problem: Discharge Planning  Goal: Discharge to home or other facility with appropriate resources  3/19/2025 0939 by Margoth Thorne RN  Outcome: Progressing  3/19/2025 0012 by Selina Hernandez RN  Outcome: Progressing     Problem: Safety - Adult  Goal: Free from fall injury  3/19/2025 0939 by Margoth Thorne RN  Outcome: Progressing  3/19/2025 0012 by Selina Hernandez RN  Outcome: Progressing     Problem: Skin/Tissue Integrity  Goal: Skin integrity remains intact  Description: 1.  Monitor for areas of redness and/or skin breakdown  2.  Assess vascular access sites hourly  3.  Every 4-6 hours minimum:  Change oxygen saturation probe site  4.  Every 4-6 hours:  If on nasal continuous positive airway pressure, respiratory therapy assess nares and determine need for appliance change or resting period  3/19/2025 0939 by Margoth Thorne RN  Outcome: Progressing  3/19/2025 0012 by Selina Hernandez RN  Outcome: Progressing     Problem: ABCDS Injury Assessment  Goal: Absence of physical injury  3/19/2025 0939 by Margoth Thorne RN  Outcome: Progressing  3/19/2025 0012 by Selina Hernandez RN  Outcome: Progressing

## 2025-03-19 NOTE — PROGRESS NOTES
Hospitalist Progress Note      PCP: Diego Dye MD    Date of Admission: 3/17/2025    Chief Complaint: AMS    Hospital Course: 76 yo F with dementia, HTN, seizure disorder was sent to ER from [facility] with AMS.  Admitted as inpatient for PNA with acute metabolic encephalopathy.  Started on IVF and IV Abx. Viral panel requested.    Subjective:  Patient is awake and severely demented.  Denies CP, SOB, HA or abdominal pain.       Medications:  Reviewed    Infusion Medications    sodium chloride      sodium chloride 100 mL/hr at 03/18/25 0516     Scheduled Medications    amLODIPine  10 mg Oral Daily    aspirin  81 mg Oral Daily    levETIRAcetam  500 mg Oral BID    lisinopril  10 mg Oral Daily    melatonin  6 mg Oral Nightly    sertraline  100 mg Oral Daily    atorvastatin  10 mg Oral Nightly    sodium chloride flush  5-40 mL IntraVENous 2 times per day    ipratropium 0.5 mg-albuterol 2.5 mg  1 Dose Inhalation Q4H WA RT    enoxaparin  40 mg SubCUTAneous Daily    cefTRIAXone (ROCEPHIN) IV  1,000 mg IntraVENous Q24H    And    azithromycin  500 mg IntraVENous Q24H    lactobacillus  1 capsule Oral BID WC    [START ON 3/19/2025] busPIRone  15 mg Oral TID    divalproex  500 mg Oral BID    valproic acid  250 mg Oral Lunch     PRN Meds: sodium chloride flush, sodium chloride, ondansetron, senna, acetaminophen **OR** acetaminophen, albuterol, ziprasidone (GEODON) 20 mg in sterile water 1 mL injection      Intake/Output Summary (Last 24 hours) at 3/18/2025 2250  Last data filed at 3/18/2025 1713  Gross per 24 hour   Intake --   Output 200 ml   Net -200 ml       Physical Exam Performed:    BP (!) 161/60   Pulse 70   Temp 100.3 °F (37.9 °C) (Oral)   Resp 16   Ht 1.626 m (5' 4\")   Wt 77.5 kg (170 lb 13.7 oz)   SpO2 95%   BMI 29.33 kg/m²     General appearance: No apparent distress, appears stated age and cooperative.  HEENT: Pupils equal, round, and reactive to light. Conjunctivae/corneas      Pneumonia  Continue IV Zithromax and Rocephin  SLP eval  Check viral panel  Droplet plus  IVF  UTI  IVF  F/U UCx  Continue Rocephin IV  Dementia with behavior changes  SLP to exclude aspiration  Geodon IM PRN agitation   Fecal impaction  KUB noted  Will need enema tomorrow  Seizure disorder  Continue Depakote and Keppra    DVT Prophylaxis: Lovenox  Diet: ADULT DIET; Dysphagia - Pureed  Code Status: Full Code  PT/OT Eval Status: [facility]    Dispo - [facility]    Discussed with patient, nursing and CM.    Discussed with daughter on phone.    Tony Perea MD

## 2025-03-20 ENCOUNTER — APPOINTMENT (OUTPATIENT)
Dept: GENERAL RADIOLOGY | Age: 75
End: 2025-03-20
Payer: COMMERCIAL

## 2025-03-20 LAB
ANION GAP SERPL CALCULATED.3IONS-SCNC: 12 MMOL/L (ref 3–16)
BASOPHILS # BLD: 0.1 K/UL (ref 0–0.2)
BASOPHILS NFR BLD: 0.6 %
BUN SERPL-MCNC: 9 MG/DL (ref 7–20)
CALCIUM SERPL-MCNC: 9.3 MG/DL (ref 8.3–10.6)
CHLORIDE SERPL-SCNC: 106 MMOL/L (ref 99–110)
CO2 SERPL-SCNC: 22 MMOL/L (ref 21–32)
CREAT SERPL-MCNC: 0.8 MG/DL (ref 0.6–1.2)
DEPRECATED RDW RBC AUTO: 14 % (ref 12.4–15.4)
EOSINOPHIL # BLD: 0.1 K/UL (ref 0–0.6)
EOSINOPHIL NFR BLD: 0.7 %
GFR SERPLBLD CREATININE-BSD FMLA CKD-EPI: 76 ML/MIN/{1.73_M2}
GLUCOSE SERPL-MCNC: 123 MG/DL (ref 70–99)
HCT VFR BLD AUTO: 39.6 % (ref 36–48)
HGB BLD-MCNC: 13.3 G/DL (ref 12–16)
LYMPHOCYTES # BLD: 2.4 K/UL (ref 1–5.1)
LYMPHOCYTES NFR BLD: 26.1 %
MCH RBC QN AUTO: 30.8 PG (ref 26–34)
MCHC RBC AUTO-ENTMCNC: 33.6 G/DL (ref 31–36)
MCV RBC AUTO: 91.6 FL (ref 80–100)
MONOCYTES # BLD: 1.1 K/UL (ref 0–1.3)
MONOCYTES NFR BLD: 12.4 %
NEUTROPHILS # BLD: 5.5 K/UL (ref 1.7–7.7)
NEUTROPHILS NFR BLD: 60.2 %
PLATELET # BLD AUTO: 225 K/UL (ref 135–450)
PMV BLD AUTO: 9.1 FL (ref 5–10.5)
POTASSIUM SERPL-SCNC: 4.1 MMOL/L (ref 3.5–5.1)
RBC # BLD AUTO: 4.32 M/UL (ref 4–5.2)
SODIUM SERPL-SCNC: 140 MMOL/L (ref 136–145)
WBC # BLD AUTO: 9.2 K/UL (ref 4–11)

## 2025-03-20 PROCEDURE — 2580000003 HC RX 258: Performed by: INTERNAL MEDICINE

## 2025-03-20 PROCEDURE — 36415 COLL VENOUS BLD VENIPUNCTURE: CPT

## 2025-03-20 PROCEDURE — 94761 N-INVAS EAR/PLS OXIMETRY MLT: CPT

## 2025-03-20 PROCEDURE — 2500000003 HC RX 250 WO HCPCS: Performed by: INTERNAL MEDICINE

## 2025-03-20 PROCEDURE — 6360000002 HC RX W HCPCS: Performed by: PHYSICIAN ASSISTANT

## 2025-03-20 PROCEDURE — 6370000000 HC RX 637 (ALT 250 FOR IP): Performed by: INTERNAL MEDICINE

## 2025-03-20 PROCEDURE — 6370000000 HC RX 637 (ALT 250 FOR IP): Performed by: PHYSICIAN ASSISTANT

## 2025-03-20 PROCEDURE — 87040 BLOOD CULTURE FOR BACTERIA: CPT

## 2025-03-20 PROCEDURE — 94640 AIRWAY INHALATION TREATMENT: CPT

## 2025-03-20 PROCEDURE — 80048 BASIC METABOLIC PNL TOTAL CA: CPT

## 2025-03-20 PROCEDURE — 92610 EVALUATE SWALLOWING FUNCTION: CPT

## 2025-03-20 PROCEDURE — 94669 MECHANICAL CHEST WALL OSCILL: CPT

## 2025-03-20 PROCEDURE — 92526 ORAL FUNCTION THERAPY: CPT

## 2025-03-20 PROCEDURE — 6360000002 HC RX W HCPCS: Performed by: INTERNAL MEDICINE

## 2025-03-20 PROCEDURE — 1200000000 HC SEMI PRIVATE

## 2025-03-20 PROCEDURE — 74018 RADEX ABDOMEN 1 VIEW: CPT

## 2025-03-20 PROCEDURE — 85025 COMPLETE CBC W/AUTO DIFF WBC: CPT

## 2025-03-20 PROCEDURE — 2500000003 HC RX 250 WO HCPCS: Performed by: PHYSICIAN ASSISTANT

## 2025-03-20 PROCEDURE — 2580000003 HC RX 258: Performed by: PHYSICIAN ASSISTANT

## 2025-03-20 RX ORDER — QUETIAPINE FUMARATE 25 MG/1
25 TABLET, FILM COATED ORAL NIGHTLY
Status: DISCONTINUED | OUTPATIENT
Start: 2025-03-20 | End: 2025-03-21 | Stop reason: HOSPADM

## 2025-03-20 RX ORDER — SODIUM CHLORIDE 9 MG/ML
INJECTION, SOLUTION INTRAVENOUS CONTINUOUS
Status: DISCONTINUED | OUTPATIENT
Start: 2025-03-20 | End: 2025-03-21

## 2025-03-20 RX ORDER — AZITHROMYCIN 250 MG/1
250 TABLET, FILM COATED ORAL DAILY
Qty: 3 TABLET | Refills: 0
Start: 2025-03-20 | End: 2025-03-21

## 2025-03-20 RX ORDER — KETOROLAC TROMETHAMINE 15 MG/ML
15 INJECTION, SOLUTION INTRAMUSCULAR; INTRAVENOUS EVERY 6 HOURS
Status: DISCONTINUED | OUTPATIENT
Start: 2025-03-20 | End: 2025-03-21 | Stop reason: HOSPADM

## 2025-03-20 RX ORDER — POLYETHYLENE GLYCOL 3350 17 G/17G
17 POWDER, FOR SOLUTION ORAL DAILY
Qty: 30 EACH | Refills: 0
Start: 2025-03-20 | End: 2025-04-19

## 2025-03-20 RX ORDER — DIVALPROEX SODIUM 125 MG/1
500 CAPSULE, COATED PELLETS ORAL EVERY 12 HOURS SCHEDULED
Status: DISCONTINUED | OUTPATIENT
Start: 2025-03-20 | End: 2025-03-21 | Stop reason: HOSPADM

## 2025-03-20 RX ADMIN — ATORVASTATIN CALCIUM 10 MG: 10 TABLET, FILM COATED ORAL at 21:27

## 2025-03-20 RX ADMIN — SERTRALINE 100 MG: 50 TABLET, FILM COATED ORAL at 09:30

## 2025-03-20 RX ADMIN — AZITHROMYCIN MONOHYDRATE 500 MG: 500 INJECTION, POWDER, LYOPHILIZED, FOR SOLUTION INTRAVENOUS at 00:12

## 2025-03-20 RX ADMIN — BUSPIRONE HYDROCHLORIDE 15 MG: 5 TABLET ORAL at 21:27

## 2025-03-20 RX ADMIN — IPRATROPIUM BROMIDE AND ALBUTEROL SULFATE 1 DOSE: .5; 3 SOLUTION RESPIRATORY (INHALATION) at 20:26

## 2025-03-20 RX ADMIN — Medication 1 CAPSULE: at 09:31

## 2025-03-20 RX ADMIN — AZITHROMYCIN MONOHYDRATE 500 MG: 500 INJECTION, POWDER, LYOPHILIZED, FOR SOLUTION INTRAVENOUS at 21:31

## 2025-03-20 RX ADMIN — AMLODIPINE BESYLATE 10 MG: 5 TABLET ORAL at 09:31

## 2025-03-20 RX ADMIN — Medication 240 ML: at 22:23

## 2025-03-20 RX ADMIN — BUSPIRONE HYDROCHLORIDE 15 MG: 5 TABLET ORAL at 09:30

## 2025-03-20 RX ADMIN — QUETIAPINE FUMARATE 25 MG: 25 TABLET ORAL at 21:27

## 2025-03-20 RX ADMIN — DIVALPROEX SODIUM 500 MG: 125 CAPSULE ORAL at 21:26

## 2025-03-20 RX ADMIN — KETOROLAC TROMETHAMINE 15 MG: 15 INJECTION, SOLUTION INTRAMUSCULAR; INTRAVENOUS at 21:27

## 2025-03-20 RX ADMIN — Medication 10 ML: at 09:53

## 2025-03-20 RX ADMIN — VALPROIC ACID 250 MG: 250 SOLUTION ORAL at 12:03

## 2025-03-20 RX ADMIN — SODIUM CHLORIDE: 0.9 INJECTION, SOLUTION INTRAVENOUS at 13:42

## 2025-03-20 RX ADMIN — KETOROLAC TROMETHAMINE 15 MG: 15 INJECTION, SOLUTION INTRAMUSCULAR; INTRAVENOUS at 13:40

## 2025-03-20 RX ADMIN — LEVETIRACETAM 500 MG: 500 TABLET, FILM COATED ORAL at 09:31

## 2025-03-20 RX ADMIN — IPRATROPIUM BROMIDE AND ALBUTEROL SULFATE 1 DOSE: .5; 3 SOLUTION RESPIRATORY (INHALATION) at 15:56

## 2025-03-20 RX ADMIN — ZIPRASIDONE MESYLATE 20 MG: 20 INJECTION, POWDER, LYOPHILIZED, FOR SOLUTION INTRAMUSCULAR at 14:09

## 2025-03-20 RX ADMIN — IPRATROPIUM BROMIDE AND ALBUTEROL SULFATE 1 DOSE: .5; 3 SOLUTION RESPIRATORY (INHALATION) at 12:58

## 2025-03-20 RX ADMIN — LEVETIRACETAM 500 MG: 500 TABLET, FILM COATED ORAL at 21:27

## 2025-03-20 RX ADMIN — WATER 1000 MG: 1 INJECTION INTRAMUSCULAR; INTRAVENOUS; SUBCUTANEOUS at 21:32

## 2025-03-20 RX ADMIN — MELATONIN TAB 3 MG 6 MG: 3 TAB at 21:26

## 2025-03-20 RX ADMIN — ASPIRIN 81 MG: 81 TABLET, COATED ORAL at 09:31

## 2025-03-20 RX ADMIN — LISINOPRIL 10 MG: 10 TABLET ORAL at 09:30

## 2025-03-20 ASSESSMENT — PAIN DESCRIPTION - ORIENTATION: ORIENTATION: LEFT

## 2025-03-20 ASSESSMENT — PAIN DESCRIPTION - LOCATION: LOCATION: NECK

## 2025-03-20 ASSESSMENT — PAIN DESCRIPTION - DESCRIPTORS: DESCRIPTORS: ACHING

## 2025-03-20 NOTE — PLAN OF CARE
Pt alert to self and disoriented to place, time and situations. Pt's call lights within reach and fall precaution maintained.     Problem: Discharge Planning  Goal: Discharge to home or other facility with appropriate resources  3/20/2025 1020 by Consuelo Ye RN  Outcome: Progressing  3/19/2025 2254 by Mayte Hylton RN  Outcome: Progressing     Problem: Safety - Adult  Goal: Free from fall injury  3/20/2025 1020 by Consuelo Ye RN  Outcome: Progressing  3/19/2025 2254 by Mayte Hylton RN  Outcome: Progressing     Problem: Skin/Tissue Integrity  Goal: Skin integrity remains intact  Description: 1.  Monitor for areas of redness and/or skin breakdown  2.  Assess vascular access sites hourly  3.  Every 4-6 hours minimum:  Change oxygen saturation probe site  4.  Every 4-6 hours:  If on nasal continuous positive airway pressure, respiratory therapy assess nares and determine need for appliance change or resting period  3/20/2025 1020 by Consuelo Ye RN  Outcome: Progressing  3/19/2025 2254 by Mayte Hylton RN  Outcome: Progressing     Problem: ABCDS Injury Assessment  Goal: Absence of physical injury  3/19/2025 2254 by Mayte Hylton RN  Outcome: Progressing     Problem: Pain  Goal: Verbalizes/displays adequate comfort level or baseline comfort level  Outcome: Progressing

## 2025-03-20 NOTE — PROGRESS NOTES
Speech Language Pathology  [facility]  [phone]  SLP Clinical Swallow Evaluation       Patient: Digna Baxter   : 1950   MRN: [id number]      Evaluation Date: 3/20/2025      Admitting Dx: Metabolic encephalopathy [G93.41]  SIRS (systemic inflammatory response syndrome) ([facility]) [R65.10]  Goals of care, counseling/discussion [Z71.89]  Pneumonia, unspecified organism [J18.9]  Pneumonia due to infectious organism, unspecified laterality, unspecified part of lung [J18.9]  Dementia, unspecified dementia severity, unspecified dementia type, unspecified whether behavioral, psychotic, or mood disturbance or anxiety ([facility]) [F03.90]  Treatment Diagnosis: Oropharyngeal Dysphagia   Pain: Unable to state , pt with facial grimacing and grabbing the left side of her neck but unable to rate/ state pain, notified RN and attempted repositioning for comfort                          Recommendations      Recommended Diet and Intervention 3/20/2025:  Diet Solids Recommendation:  Dysphagia I Puree  Liquid Consistency Recommendation:  Thin liquids  Recommended form of Meds: Meds crushed as able in puree          Compensatory strategies: Upright as possible with all PO intake , Small bites/sips , Eat/feed slowly, Total Feed , Aspiration Precautions , To be determined     Discharge Recommendations:  Discharge recommendations to be determined pending ongoing follow-up during acute care stay. If pt does not return to ECF on baseline diet would recommend speech therapy f/u.    History/Course of Treatment     H&P: Digna Baxter is a 75 y.o. female with history of advanced dementia who presents to the emergency department at the request of her daughter.  Patient is a resident at [facility].  P patient's daughter lives in [facility], but does regularly visit every 3 to 4 weeks.  Last saw her mom was the end of February.  Reports history of dementia, but normal ability to ambulate  onset    Eating Assistance:   Substantial or maximal assistance    Goals     Goals:   Dysphagia Goals: Pt will functionally tolerate recommended diet with no overt clinical s/s of aspiration   Pt will advance to least restrictive diet as indicated     Above goals reviewed on 3/20/2025.  All goals are ongoing at this time unless indicated above.       POC/Education     Dysphagia Therapeutic Intervention:  Diet Tolerance Monitoring , Patient/Family Education , To be determined     Plan of care: 3-5 times per week during acute care stay.      Education:  Provided education regarding role of SLP, results of assessment, recommendations and general speech pathology plan of care:  No evidence of comprehension    If patient discharges prior to next visit, this note will serve as discharge.     Treatment time:  Timed Code Treatment Minutes: 0   Total Treatment Time Minutes: 23     Electronically signed by:    Isi Andrade M.A. CCC-SLP #[id number] 3/20/2025 8:59 AM  Speech-Language Pathologist

## 2025-03-20 NOTE — PROGRESS NOTES
Patient was agitated and combative sometimes, take all night medications with daughter at bedside. Remove O2 NC each time it was placed. Camera in place, does not want to be touched sometimes  The care plan and education has been reviewed and mutually agreed upon with the patient.

## 2025-03-20 NOTE — PLAN OF CARE
Problem: Discharge Planning  Goal: Discharge to home or other facility with appropriate resources  3/19/2025 2254 by Mayte Hylton RN  Outcome: Progressing  3/19/2025 0939 by Margoth Thorne RN  Outcome: Progressing     Problem: Safety - Adult  Goal: Free from fall injury  3/19/2025 2254 by Mayte Hylton RN  Outcome: Progressing  3/19/2025 0939 by Margoth Thorne RN  Outcome: Progressing     Problem: Skin/Tissue Integrity  Goal: Skin integrity remains intact  Description: 1.  Monitor for areas of redness and/or skin breakdown  2.  Assess vascular access sites hourly  3.  Every 4-6 hours minimum:  Change oxygen saturation probe site  4.  Every 4-6 hours:  If on nasal continuous positive airway pressure, respiratory therapy assess nares and determine need for appliance change or resting period  3/19/2025 2254 by Mayte Hylton RN  Outcome: Progressing  3/19/2025 0939 by Margoth Thorne RN  Outcome: Progressing     Problem: ABCDS Injury Assessment  Goal: Absence of physical injury  3/19/2025 2254 by Mayte Hylton RN  Outcome: Progressing  3/19/2025 0939 by Margoth Thorne RN  Outcome: Progressing

## 2025-03-20 NOTE — PROGRESS NOTES
0900: Pt's daughter Nargis called for an updates. This nurse updated pt's plan of care to the daughter and questions were answered.      0915: Pt Appeared agitated and combative at times. Pt is alert to self and disoriented to time, place and situations. Attempted to redirect pt successfully. Pt take all AM meds crushed with apple sauce. Camera is in place. The care plan and education has been reviewed and mutually agreed upon with patient.     11:00: Pt HR-132, Notified MD through perfect serve as following, \" Tommy KNOTT Pt appeared to be tacy this AM HR- 132, C/o of neck and chest pain. do you want any intervention?\"     1409- PRN Ziprasidone given d/t agitations and combative

## 2025-03-20 NOTE — DISCHARGE INSTR - COC
Continuity of Care Form    Patient Name: Digna Baxter   :  1950  MRN:  [id number]    Admit date:  3/17/2025  Discharge date:  ***    Code Status Order: Full Code   Advance Directives:     Admitting Physician:  Qian Tenorio MD  PCP: Diego Dye MD    Discharging Nurse: ***  Discharging Hospital Unit/Room#: 4TN-4461/4461-01  Discharging Unit Phone Number: ***    Emergency Contact:   Extended Emergency Contact Information  Primary Emergency Contact: Nargis Baxter  Home Phone: [phone]  Mobile Phone: [phone]  Relation: Child  Secondary Emergency Contact: Anusha Lyon  Home Phone: [phone]  Relation: Child    Past Surgical History:  History reviewed. No pertinent surgical history.    Immunization History:   Immunization History   Administered Date(s) Administered    COVID-19, MODERNA BLUE border, Primary or Immunocompromised, (age 12y+), IM, 100 mcg/0.5mL 2021, 2021    COVID-19, MODERNA Booster BLUE border, (age 18y+), IM, 50mcg/0.25mL 2021       Active Problems:  Patient Active Problem List   Diagnosis Code    Symptom of syncope R55    Altered mental state R41.82    Dementia (HCC) F03.90    HTN (hypertension) I10    High cholesterol E78.00    Complicated UTI (urinary tract infection) N39.0    Bradycardia R00.1    Pneumonia, unspecified organism J18.9       Isolation/Infection:   Isolation            No Isolation          Patient Infection Status    None to display              Nurse Assessment:  Last Vital Signs: /72   Pulse 88   Temp 99.5 °F (37.5 °C) (Axillary)   Resp 16   Ht 1.626 m (5' 4\")   Wt 77.5 kg (170 lb 13.7 oz)   SpO2 92%   BMI 29.33 kg/m²     Last documented pain score (0-10 scale):    Last Weight:   Wt Readings from Last 1 Encounters:   25 77.5 kg (170 lb 13.7 oz)     Mental Status:  {IP PT MENTAL STATUS:65491}    IV Access:  { NICK IV ACCESS:669353906}    Nursing Mobility/ADLs:  Walking   {[facility] DME ADLs:222992834}  Transfer  {[facility] DME  [phone]        Dialysis Facility (if applicable)   Name:  Address:  Dialysis Schedule:  Phone:  Fax:    / signature: Electronically signed by SHIVA Stallworth on 3/21/25 at 3:28 PM EDT    PHYSICIAN SECTION    Prognosis: Guarded    Condition at Discharge: Stable    Rehab Potential (if transferring to Rehab): Guarded    Recommended Labs or Other Treatments After Discharge:     Physician Certification: I certify the above information and transfer of Digna Baxter  is necessary for the continuing treatment of the diagnosis listed and that she requires Skilled Nursing Facility for less 30 days.     Update Admission H&P: No change in H&P    PHYSICIAN SIGNATURE:  Electronically signed by Tony Perea MD on 3/20/25 at 8:48 AM EDT

## 2025-03-20 NOTE — PROGRESS NOTES
Hospitalist Progress Note      PCP: Diego Dye MD    Date of Admission: 3/17/2025    Chief Complaint: AMS    Hospital Course: 76 yo F with dementia, HTN, seizure disorder was sent to ER from [facility] with AMS.  Admitted as inpatient for PNA with acute metabolic encephalopathy.  Started on IVF and IV Abx. Viral panel not detected.  Ordered for molasses enema for fecal impaction, repeat KUB with stool burden still noted.    Subjective:  Patient remains awake and severely demented.  Apparently has been agitated with staff.  Denies CP, SOB, HA or abdominal pain.  Complained of L neck pain today.      Medications:  Reviewed    Infusion Medications    sodium chloride 50 mL/hr at 03/20/25 1342    sodium chloride       Scheduled Medications    milk and molasses  240 mL Rectal Once    divalproex  500 mg Oral 2 times per day    ketorolac  15 mg IntraVENous Q6H    [START ON 3/21/2025] milk and molasses  240 mL Rectal Once    amLODIPine  10 mg Oral Daily    aspirin  81 mg Oral Daily    levETIRAcetam  500 mg Oral BID    lisinopril  10 mg Oral Daily    melatonin  6 mg Oral Nightly    sertraline  100 mg Oral Daily    atorvastatin  10 mg Oral Nightly    sodium chloride flush  5-40 mL IntraVENous 2 times per day    ipratropium 0.5 mg-albuterol 2.5 mg  1 Dose Inhalation Q4H WA RT    enoxaparin  40 mg SubCUTAneous Daily    cefTRIAXone (ROCEPHIN) IV  1,000 mg IntraVENous Q24H    And    azithromycin  500 mg IntraVENous Q24H    lactobacillus  1 capsule Oral BID WC    busPIRone  15 mg Oral TID    valproic acid  250 mg Oral Lunch     PRN Meds: sodium chloride flush, sodium chloride, ondansetron, senna, acetaminophen **OR** acetaminophen, albuterol, ziprasidone (GEODON) 20 mg in sterile water 1 mL injection      Intake/Output Summary (Last 24 hours) at 3/20/2025 1741  Last data filed at 3/20/2025 0920  Gross per 24 hour   Intake --   Output 500 ml   Net -500 ml       Physical Exam Performed:    /68   Pulse  pattern. Moderate stool burden.         XR ABDOMEN (KUB) (SINGLE AP VIEW)   Final Result   Nonobstructive bowel gas pattern. Moderate constipation.         XR CHEST PORTABLE   Final Result   1. Nonspecific pulmonary infiltrates are commonly seen with atypical/viral   pneumonia.   2. Calcific atherosclerosis aorta.   3. Cardiomegaly.         CT Head W/O Contrast   Preliminary Result   No acute intracranial abnormality.             None    Assessment/Plan:    Active Hospital Problems    Diagnosis     Pneumonia, unspecified organism [J18.9]      Pneumonia  Continue IV Zithromax and Rocephin  SLP eval  Negative viral panel  DC droplet   S/P IVF  UTI  S/P IVF  UCx with NGTD  Continue Rocephin IV  Dementia with behavior changes  SLP recommend Puree  Resume Seroquel qhs  Geodon IM PRN agitation   Fecal impaction  KUB noted  KUB today still with stool burden  I suspect this is causing many issues of referred pain and agitation  Molasses enema today (I left a message with daughter to recommend enema to resolve fecal impaction)  Seizure disorder  Continue Depakote and Keppra    DVT Prophylaxis: Lovenox  Diet: ADULT DIET; Dysphagia - Pureed  Code Status: Full Code  PT/OT Eval Status: [facility] L[facility]    Dispo - [facility]    Discussed with patient, nursing and CM.    IVF tonight.  Resolve impaction.  Back to LTC tomorrow.    Tony Perea MD

## 2025-03-21 VITALS
WEIGHT: 170.86 LBS | TEMPERATURE: 97.4 F | HEART RATE: 59 BPM | OXYGEN SATURATION: 92 % | DIASTOLIC BLOOD PRESSURE: 64 MMHG | RESPIRATION RATE: 18 BRPM | BODY MASS INDEX: 29.17 KG/M2 | SYSTOLIC BLOOD PRESSURE: 140 MMHG | HEIGHT: 64 IN

## 2025-03-21 LAB
ANION GAP SERPL CALCULATED.3IONS-SCNC: 11 MMOL/L (ref 3–16)
BACTERIA BLD CULT ORG #2: NORMAL
BASOPHILS # BLD: 0 K/UL (ref 0–0.2)
BASOPHILS NFR BLD: 0.4 %
BUN SERPL-MCNC: 15 MG/DL (ref 7–20)
CALCIUM SERPL-MCNC: 8.8 MG/DL (ref 8.3–10.6)
CHLORIDE SERPL-SCNC: 111 MMOL/L (ref 99–110)
CO2 SERPL-SCNC: 21 MMOL/L (ref 21–32)
CREAT SERPL-MCNC: 0.9 MG/DL (ref 0.6–1.2)
DEPRECATED RDW RBC AUTO: 13.9 % (ref 12.4–15.4)
EOSINOPHIL # BLD: 0.2 K/UL (ref 0–0.6)
EOSINOPHIL NFR BLD: 2.1 %
GFR SERPLBLD CREATININE-BSD FMLA CKD-EPI: 66 ML/MIN/{1.73_M2}
GLUCOSE SERPL-MCNC: 98 MG/DL (ref 70–99)
HCT VFR BLD AUTO: 36 % (ref 36–48)
HGB BLD-MCNC: 12.3 G/DL (ref 12–16)
LYMPHOCYTES # BLD: 2.6 K/UL (ref 1–5.1)
LYMPHOCYTES NFR BLD: 34.7 %
MCH RBC QN AUTO: 31.2 PG (ref 26–34)
MCHC RBC AUTO-ENTMCNC: 34.1 G/DL (ref 31–36)
MCV RBC AUTO: 91.6 FL (ref 80–100)
MONOCYTES # BLD: 0.7 K/UL (ref 0–1.3)
MONOCYTES NFR BLD: 9.6 %
NEUTROPHILS # BLD: 3.9 K/UL (ref 1.7–7.7)
NEUTROPHILS NFR BLD: 53.2 %
PLATELET # BLD AUTO: 245 K/UL (ref 135–450)
PMV BLD AUTO: 8.9 FL (ref 5–10.5)
POTASSIUM SERPL-SCNC: 3.7 MMOL/L (ref 3.5–5.1)
RBC # BLD AUTO: 3.93 M/UL (ref 4–5.2)
SODIUM SERPL-SCNC: 143 MMOL/L (ref 136–145)
WBC # BLD AUTO: 7.3 K/UL (ref 4–11)

## 2025-03-21 PROCEDURE — 36415 COLL VENOUS BLD VENIPUNCTURE: CPT

## 2025-03-21 PROCEDURE — 94640 AIRWAY INHALATION TREATMENT: CPT

## 2025-03-21 PROCEDURE — 92526 ORAL FUNCTION THERAPY: CPT

## 2025-03-21 PROCEDURE — 6370000000 HC RX 637 (ALT 250 FOR IP): Performed by: PHYSICIAN ASSISTANT

## 2025-03-21 PROCEDURE — 6370000000 HC RX 637 (ALT 250 FOR IP): Performed by: INTERNAL MEDICINE

## 2025-03-21 PROCEDURE — 85025 COMPLETE CBC W/AUTO DIFF WBC: CPT

## 2025-03-21 PROCEDURE — 80048 BASIC METABOLIC PNL TOTAL CA: CPT

## 2025-03-21 PROCEDURE — 2700000000 HC OXYGEN THERAPY PER DAY

## 2025-03-21 PROCEDURE — 6360000002 HC RX W HCPCS: Performed by: INTERNAL MEDICINE

## 2025-03-21 PROCEDURE — 2500000003 HC RX 250 WO HCPCS: Performed by: PHYSICIAN ASSISTANT

## 2025-03-21 PROCEDURE — 94761 N-INVAS EAR/PLS OXIMETRY MLT: CPT

## 2025-03-21 RX ORDER — IPRATROPIUM BROMIDE AND ALBUTEROL SULFATE 2.5; .5 MG/3ML; MG/3ML
1 SOLUTION RESPIRATORY (INHALATION)
Status: DISCONTINUED | OUTPATIENT
Start: 2025-03-21 | End: 2025-03-21 | Stop reason: HOSPADM

## 2025-03-21 RX ORDER — LACTULOSE 10 G/15ML
30 SOLUTION ORAL DAILY
Status: DISCONTINUED | OUTPATIENT
Start: 2025-03-21 | End: 2025-03-21 | Stop reason: HOSPADM

## 2025-03-21 RX ORDER — LACTULOSE 10 G/15ML
30 SOLUTION ORAL DAILY
Qty: 1 EACH | Refills: 0
Start: 2025-03-21

## 2025-03-21 RX ORDER — AZITHROMYCIN 250 MG/1
250 TABLET, FILM COATED ORAL DAILY
Qty: 1 TABLET | Refills: 0
Start: 2025-03-22 | End: 2025-03-21 | Stop reason: HOSPADM

## 2025-03-21 RX ADMIN — Medication 10 ML: at 09:33

## 2025-03-21 RX ADMIN — BUSPIRONE HYDROCHLORIDE 15 MG: 5 TABLET ORAL at 14:12

## 2025-03-21 RX ADMIN — SERTRALINE 100 MG: 50 TABLET, FILM COATED ORAL at 09:12

## 2025-03-21 RX ADMIN — KETOROLAC TROMETHAMINE 15 MG: 15 INJECTION, SOLUTION INTRAMUSCULAR; INTRAVENOUS at 09:29

## 2025-03-21 RX ADMIN — DIVALPROEX SODIUM 500 MG: 125 CAPSULE ORAL at 09:12

## 2025-03-21 RX ADMIN — LEVETIRACETAM 500 MG: 500 TABLET, FILM COATED ORAL at 09:12

## 2025-03-21 RX ADMIN — LACTULOSE 30 G: 10 SOLUTION ORAL at 12:28

## 2025-03-21 RX ADMIN — AMLODIPINE BESYLATE 10 MG: 5 TABLET ORAL at 09:12

## 2025-03-21 RX ADMIN — VALPROIC ACID 250 MG: 250 SOLUTION ORAL at 12:28

## 2025-03-21 RX ADMIN — Medication 1 CAPSULE: at 09:13

## 2025-03-21 RX ADMIN — IPRATROPIUM BROMIDE AND ALBUTEROL SULFATE 1 DOSE: .5; 3 SOLUTION RESPIRATORY (INHALATION) at 09:41

## 2025-03-21 RX ADMIN — KETOROLAC TROMETHAMINE 15 MG: 15 INJECTION, SOLUTION INTRAMUSCULAR; INTRAVENOUS at 11:52

## 2025-03-21 RX ADMIN — KETOROLAC TROMETHAMINE 15 MG: 15 INJECTION, SOLUTION INTRAMUSCULAR; INTRAVENOUS at 00:45

## 2025-03-21 RX ADMIN — BUSPIRONE HYDROCHLORIDE 15 MG: 5 TABLET ORAL at 09:13

## 2025-03-21 RX ADMIN — LISINOPRIL 10 MG: 10 TABLET ORAL at 09:12

## 2025-03-21 RX ADMIN — ASPIRIN 81 MG: 81 TABLET, COATED ORAL at 09:12

## 2025-03-21 NOTE — PROGRESS NOTES
BM movement after enamel, medium formed output. Still very combative, resisting care, throwing fists, hitting staff, and verbally aggressive. O2 dropping below 90 when asleep, patient refused NC, removing the cannula each time it is applied. Took night meds after several minutes of talk and persuasion. All safety precautions in place.

## 2025-03-21 NOTE — PROGRESS NOTES
0857: Pt's daughter Nargis called for an updates. This nurse updated pt's plan of care to the daughter and all questions were answered.      0900: Attempted to fed pt without any success. Pt spited out all the food and drinks.     0910: Pt calm and follow commands.  Pt is alert to self and disoriented to time, place and situations. Attempted to redirect pt successfully. Pt take all AM meds crushed with apple sauce. Camera is in place. The care plan and education has been reviewed and mutually agreed upon with patient

## 2025-03-21 NOTE — CARE COORDINATION
Discharge Planning:     (CM) [facility] , [phone][facility]    Electronically signed by SHIVA Stallworth on 3/21/2025 at 8:47 AM

## 2025-03-21 NOTE — CARE COORDINATION
Case Management -  Discharge Note      Patient Name: Digna Baxter                   YOB: 1950  Room: [address] [place], [place] [id number]            Readmission Risk (Low < 19, Mod (19-27), High > 27): Readmission Risk Score: 8.7    Current PCP: Diego Dye MD      (IMM) Important Message from Medicare:    Has pt received appropriate compliance notices before being discharged if required: yes  Compliance doc:  [x] 2nd IMM; [] Code 44 [] Klein  Date Given: 3/19/25 Given By: OSBALDO    PT AM-PAC:   /24  OT AM-PAC:   /24    Patient/patient representative has been educated on the benefits of LTC as well as the possible risks of declining recommended services. Patient/patient representative has acknowledged the information provided and decided on the following discharge plan. Patient/ patient representative has been provided freedom of choice regarding service provider, supported by basic dialogue that supports the patient's individualized plan of care/goals.    Patient noted to have a discharge order.  Pt has been medically cleared to return to LTC (Long Term Care)    Patient discharged to   [address] [address]  Report: [phone]  Fax: [phone]   [facility]       Pre-cert Required/obtained: yes  Transportation scheduled for 3/21/25 at 530 pm  Transportation provided by [facility] [phone]  AVS faxed and agency notified: yes  The following prescriptions sent with pt:  n/a  Family Notified: yes  Nurse to call report to facility      [facility] agency notified of discharge:  [] Yes [] No  [x] NA    Family notified of discharge:  [x] Yes  [] No  [] NA    Facility notified of discharge:  [x] Yes  [] No  [] NA    Pt is being discharged with Outpt IV Antibiotics  [] Yes [] No  [x] NA  If yes, make sure NICK is faxed to [facility] agency, and meds are called in to pharmacy by RN from NICK orders only.      Financial    Payor: DANN HERNANDEZ [place] / Plan: QUIG[employer]IS HERNANDE[employer] [place] DUAL / Product Type: *No

## 2025-03-21 NOTE — PLAN OF CARE
Problem: Discharge Planning  Goal: Discharge to home or other facility with appropriate resources  3/20/2025 2315 by Mayte Hylton RN  Outcome: Progressing  3/20/2025 1020 by Consuelo Ye RN  Outcome: Progressing     Problem: Safety - Adult  Goal: Free from fall injury  3/20/2025 2315 by Mayte Hylton RN  Outcome: Progressing  3/20/2025 1020 by Consuelo Ye RN  Outcome: Progressing     Problem: Skin/Tissue Integrity  Goal: Skin integrity remains intact  Description: 1.  Monitor for areas of redness and/or skin breakdown  2.  Assess vascular access sites hourly  3.  Every 4-6 hours minimum:  Change oxygen saturation probe site  4.  Every 4-6 hours:  If on nasal continuous positive airway pressure, respiratory therapy assess nares and determine need for appliance change or resting period  3/20/2025 2315 by Mayte Hylton RN  Outcome: Progressing  3/20/2025 1020 by Consuelo Ye RN  Outcome: Progressing     Problem: ABCDS Injury Assessment  Goal: Absence of physical injury  Outcome: Progressing     Problem: Pain  Goal: Verbalizes/displays adequate comfort level or baseline comfort level  3/20/2025 2315 by Mayte Hylton RN  Outcome: Progressing  3/20/2025 1020 by Consuelo Ye RN  Outcome: Progressing

## 2025-03-21 NOTE — DISCHARGE SUMMARY
control, iterative reconstruction, and/or weight based adjustment of the mA/kV was utilized to reduce the radiation dose to as low as reasonably achievable. COMPARISON: 05/29/2024 HISTORY: ORDERING SYSTEM PROVIDED HISTORY: AMS from baseline TECHNOLOGIST PROVIDED HISTORY: Reason for exam:->AMS from baseline Has a \"code stroke\" or \"stroke alert\" been called?->No Decision Support Exception - unselect if not a suspected or confirmed emergency medical condition->Emergency Medical Condition (MA) Reason for Exam: AMS from baseline FINDINGS: BRAIN/VENTRICLES: There is no acute intracranial hemorrhage, mass effect or midline shift.  No abnormal extra-axial fluid collection.  The gray-white differentiation is maintained without evidence of an acute infarct.  There is no evidence of hydrocephalus. Stable parenchymal atrophy.  Patchy areas of low-attenuation in the periventricular and subcortical white matter likely related to chronic small vessel ischemic changes. ORBITS: The visualized portion of the orbits demonstrate no acute abnormality. SINUSES: The visualized paranasal sinuses and mastoid air cells demonstrate no acute abnormality. SOFT TISSUES/SKULL:  No acute abnormality of the visualized skull or soft tissues.     No acute intracranial abnormality.     XR CHEST PORTABLE  Result Date: 3/17/2025  EXAMINATION: ONE XRAY VIEW OF THE CHEST 3/17/2025 8:39 pm COMPARISON: Chest 05/31/2024 HISTORY: Fever, AMS FINDINGS: The cardiac silhouette is enlarged.  Calcifications involving the aorta reflect atherosclerosis. The mediastinal and hilar silhouettes appear unremarkable. Scattered pulmonary opacities bilateral mid and lower lungs.  Low lung volumes.  No pleural effusion evident. No pneumothorax is seen. No acute osseous abnormality is identified.     1. Nonspecific pulmonary infiltrates are commonly seen with atypical/viral pneumonia. 2. Calcific atherosclerosis aorta. 3. Cardiomegaly.         The patient was seen and examined  on day of discharge and this discharge summary is in conjunction with any daily progress note from day of discharge.Time Spent on discharge is 35 minutes in the examination, evaluation, counseling and review of medications and discharge plan.      Note that more than 30 minutes was spent in preparing discharge papers, discussing discharge with patient, medication review, etc.       Signed:    Tony Perea MD   3/21/2025      Thank you Diego Dye MD for the opportunity to be involved in this patient's care. If you have any questions or concerns please feel free to contact me at [facility]

## 2025-03-21 NOTE — PLAN OF CARE
Problem: Discharge Planning  Goal: Discharge to home or other facility with appropriate resources  3/21/2025 0955 by Consuelo Ye RN  Outcome: Progressing  3/20/2025 2315 by Mayte Hylton RN  Outcome: Progressing     Problem: Safety - Adult  Goal: Free from fall injury  3/21/2025 0955 by Consuelo Ye RN  Outcome: Progressing  3/20/2025 2315 by Mayte Hylton RN  Outcome: Progressing     Problem: Skin/Tissue Integrity  Goal: Skin integrity remains intact  Description: 1.  Monitor for areas of redness and/or skin breakdown  2.  Assess vascular access sites hourly  3.  Every 4-6 hours minimum:  Change oxygen saturation probe site  4.  Every 4-6 hours:  If on nasal continuous positive airway pressure, respiratory therapy assess nares and determine need for appliance change or resting period  3/21/2025 0955 by Consuelo Ye RN  Outcome: Progressing  3/20/2025 2315 by Mayte Hylton RN  Outcome: Progressing     Problem: ABCDS Injury Assessment  Goal: Absence of physical injury  3/21/2025 0955 by Consuelo Ye RN  Outcome: Progressing  3/20/2025 2315 by Mayte Hylton RN  Outcome: Progressing     Problem: Pain  Goal: Verbalizes/displays adequate comfort level or baseline comfort level  3/21/2025 0955 by Consuelo Ye RN  Outcome: Progressing  3/20/2025 2315 by Mayte Hylton RN  Outcome: Progressing

## 2025-03-21 NOTE — RT PROTOCOL NOTE
RT Inhaler-Nebulizer Bronchodilator Protocol Note    There is a bronchodilator order in the chart from a provider indicating to follow the RT Bronchodilator Protocol and there is an “Initiate RT Inhaler-Nebulizer Bronchodilator Protocol” order as well (see protocol at bottom of note).    CXR Findings:  No results found.    The findings from the last RT Protocol Assessment were as follows:   History Pulmonary Disease: None or smoker <15 pack years  Respiratory Pattern: Dyspnea on exertion or RR 21-25 bpm  Breath Sounds: Slightly diminished and/or crackles  Cough: Weak, productive  Indication for Bronchodilator Therapy: On home bronchodilators  Bronchodilator Assessment Score: 6    Aerosolized bronchodilator medication orders have been revised according to the RT Inhaler-Nebulizer Bronchodilator Protocol below.    Respiratory Therapist to perform RT Therapy Protocol Assessment initially then follow the protocol.  Repeat RT Therapy Protocol Assessment PRN for score 0-3 or on second treatment, BID, and PRN for scores above 3.    No Indications - adjust the frequency to every 6 hours PRN wheezing or bronchospasm, if no treatments needed after 48 hours then discontinue using Per Protocol order mode.     If indication present, adjust the RT bronchodilator orders based on the Bronchodilator Assessment Score as indicated below.  Use Inhaler orders unless patient has one or more of the following: on home nebulizer, not able to hold breath for 10 seconds, is not alert and oriented, cannot activate and use MDI correctly, or respiratory rate 25 breaths per minute or more, then use the equivalent nebulizer order(s) with same Frequency and PRN reasons based on the score.  If a patient is on this medication at home then do not decrease Frequency below that used at home.    0-3 - enter or revise RT bronchodilator order(s) to equivalent RT Bronchodilator order with Frequency of every 4 hours PRN for wheezing or increased work of

## 2025-03-21 NOTE — PROGRESS NOTES
Hospitalist Progress Note      PCP: Diego Dye MD    Date of Admission: 3/17/2025    Chief Complaint: AMS    Hospital Course: 76 yo F with dementia, HTN, seizure disorder was sent to ER from [facility] with AMS.  Admitted as inpatient for PNA with acute metabolic encephalopathy.  Started on IVF and IV Abx. Viral panel not detected.  Given for molasses enema for fecal impaction as repeat KUB with stool burden still noted.    Subjective:  Patient apparently had BM.  Denies CP, SOB, HA or abdominal pain.        Medications:  Reviewed    Infusion Medications    sodium chloride 50 mL/hr at 03/20/25 1342    sodium chloride       Scheduled Medications    lactulose  30 g Oral Daily    ipratropium 0.5 mg-albuterol 2.5 mg  1 Dose Inhalation BID RT    milk and molasses  240 mL Rectal Once    divalproex  500 mg Oral 2 times per day    ketorolac  15 mg IntraVENous Q6H    QUEtiapine  25 mg Oral Nightly    amLODIPine  10 mg Oral Daily    aspirin  81 mg Oral Daily    levETIRAcetam  500 mg Oral BID    lisinopril  10 mg Oral Daily    melatonin  6 mg Oral Nightly    sertraline  100 mg Oral Daily    atorvastatin  10 mg Oral Nightly    sodium chloride flush  5-40 mL IntraVENous 2 times per day    enoxaparin  40 mg SubCUTAneous Daily    lactobacillus  1 capsule Oral BID WC    busPIRone  15 mg Oral TID    valproic acid  250 mg Oral Lunch     PRN Meds: sodium chloride flush, sodium chloride, ondansetron, senna, acetaminophen **OR** acetaminophen, albuterol, ziprasidone (GEODON) 20 mg in sterile water 1 mL injection    No intake or output data in the 24 hours ending 03/21/25 1314      Physical Exam Performed:    /75   Pulse 54   Temp 97.2 °F (36.2 °C) (Axillary)   Resp 16   Ht 1.626 m (5' 4\")   Wt 77.5 kg (170 lb 13.7 oz)   SpO2 97%   BMI 29.33 kg/m²     General appearance: No apparent distress, appears stated age and cooperative.  HEENT: Pupils equal, round, and reactive to light. Conjunctivae/corneas    pneumonia.   2. Calcific atherosclerosis aorta.   3. Cardiomegaly.         CT Head W/O Contrast   Preliminary Result   No acute intracranial abnormality.             None    Assessment/Plan:    Active Hospital Problems    Diagnosis     Pneumonia, unspecified organism [J18.9]      Pneumonia  S/P 3 days of IV Zithromax   Continue IV Rocephin while here  SLP eval  Negative viral panel  DC droplet   S/P IVF  UTI  S/P IVF  UCx with NGTD  Continue Rocephin IV while here  Dementia with behavior changes  SLP recommend Puree  Resume Seroquel qhs  Geodon IM PRN agitation   Fecal impaction  KUB noted  Repeat KUB still with stool burden  I suspected this is causing many issues of referred pain and agitation  Molasses enema given 3/20  Start Lactulose (hold if more than 3 BM in day)  Seizure disorder  Continue Depakote and Keppra    DVT Prophylaxis: Lovenox  Diet: ADULT DIET; Dysphagia - Pureed  Code Status: Full Code  PT/OT Eval Status: [facility]    Dispo - [facility]    Discussed with patient, nursing and CM.    Medically stable for DC back to LTC today.    Tony Perea MD

## 2025-03-21 NOTE — PROGRESS NOTES
Speech Language Pathology  [facility] [place] - [facility]  [phone]  SLP Dysphagia Treatment       Patient: Digna Baxter   : 1950   MRN: [id number]      Evaluation Date: 3/21/2025      Admitting Dx: Metabolic encephalopathy [G93.41]  SIRS (systemic inflammatory response syndrome) ([facility]) [R65.10]  Goals of care, counseling/discussion [Z71.89]  Pneumonia, unspecified organism [J18.9]  Pneumonia due to infectious organism, unspecified laterality, unspecified part of lung [J18.9]  Dementia, unspecified dementia severity, unspecified dementia type, unspecified whether behavioral, psychotic, or mood disturbance or anxiety ([facility]) [F03.90]  Treatment Diagnosis: Oropharyngeal Dysphagia   Pain: Unable to state , pt with facial grimacing and grabbing the left side of her neck but unable to rate/ state pain, notified RN and attempted repositioning for comfort                          Recommendations      Recommended Diet and Intervention 3/21/2025:  Diet Solids Recommendation:  Dysphagia I Puree  Liquid Consistency Recommendation:  Thin liquids  Recommended form of Meds: Meds crushed as able in puree      1:1 TOTAL FEED      Compensatory strategies: Upright as possible with all PO intake , Small bites/sips , Eat/feed slowly, Total Feed , Aspiration Precautions , To be determined     Discharge Recommendations:  Discharge recommendations to be determined pending ongoing follow-up during acute care stay. If pt does not return to ECF on baseline diet would recommend speech therapy f/u.    History/Course of Treatment     H&P: Digna Baxter is a 75 y.o. female with history of advanced dementia who presents to the emergency department at the request of her daughter.  Patient is a resident at [facility].  P patient's daughter lives in [facility], but does regularly visit every 3 to 4 weeks.  Last saw her mom was the end of February.  Reports history of dementia, but normal ability to

## 2025-03-21 NOTE — PROGRESS NOTES
03/21/25 1233   RT Protocol   History Pulmonary Disease 0   Respiratory pattern 2   Breath sounds 2   Cough 2   Bronchodilator Assessment Score 6

## 2025-03-21 NOTE — PLAN OF CARE
Problem: Discharge Planning  Goal: Discharge to home or other facility with appropriate resources  3/21/2025 1701 by Consuelo Ye RN  Outcome: Completed  3/21/2025 0955 by Consuelo Ye RN  Outcome: Progressing     Problem: Safety - Adult  Goal: Free from fall injury  3/21/2025 1701 by Consuelo Ye RN  Outcome: Completed  3/21/2025 0955 by Consuelo Ye RN  Outcome: Progressing     Problem: Skin/Tissue Integrity  Goal: Skin integrity remains intact  Description: 1.  Monitor for areas of redness and/or skin breakdown  2.  Assess vascular access sites hourly  3.  Every 4-6 hours minimum:  Change oxygen saturation probe site  4.  Every 4-6 hours:  If on nasal continuous positive airway pressure, respiratory therapy assess nares and determine need for appliance change or resting period  3/21/2025 1701 by Consuelo Ye RN  Outcome: Completed  3/21/2025 0955 by Consuelo Ye RN  Outcome: Progressing     Problem: ABCDS Injury Assessment  Goal: Absence of physical injury  3/21/2025 1701 by Consuelo Ye RN  Outcome: Completed  3/21/2025 0955 by Consuelo Ye RN  Outcome: Progressing     Problem: Pain  Goal: Verbalizes/displays adequate comfort level or baseline comfort level  3/21/2025 1701 by Consuelo Ye RN  Outcome: Completed  3/21/2025 0955 by Consuelo Ye RN  Outcome: Progressing

## 2025-03-23 LAB — BACTERIA BLD CULT: NORMAL

## 2025-03-24 ENCOUNTER — TELEPHONE (OUTPATIENT)
Dept: PRIMARY CARE CLINIC | Age: 75
End: 2025-03-24

## 2025-03-24 LAB — BACTERIA BLD CULT ORG #2: NORMAL

## 2025-03-24 NOTE — TELEPHONE ENCOUNTER
Care Transitions Initial Follow Up Call    Outreach made within 2 business days of discharge: Yes    Patient: Digna Baxter Patient : 1950   MRN: [id number]  Reason for Admission: 2025  Discharge Date: 3/21/25       Spoke with: Unable to reach      Discharge department/facility: [facility]    No needs identified             Scheduled appointment with PCP within 7-14 days    Follow Up  No future appointments.    Isis Canchola MA

## 2025-03-24 NOTE — TELEPHONE ENCOUNTER
Patients daughter returned call regarding HFU.    She stated she does not see Hardik as her PCP anymore but instead will see Dr.Jonathan Smith going forward.     She will follow up with new PCP next week.

## 2025-03-30 ENCOUNTER — HOSPITAL ENCOUNTER (EMERGENCY)
Age: 75
Discharge: NURSING, REHAB OR LONG-TERM CARE | End: 2025-03-30
Attending: EMERGENCY MEDICINE
Payer: COMMERCIAL

## 2025-03-30 VITALS
BODY MASS INDEX: 29.02 KG/M2 | DIASTOLIC BLOOD PRESSURE: 59 MMHG | HEIGHT: 64 IN | OXYGEN SATURATION: 94 % | WEIGHT: 170 LBS | RESPIRATION RATE: 18 BRPM | SYSTOLIC BLOOD PRESSURE: 138 MMHG | HEART RATE: 55 BPM | TEMPERATURE: 98.2 F

## 2025-03-30 DIAGNOSIS — T74.21XA REPORTED SEXUAL ASSAULT OF ADULT: Primary | ICD-10-CM

## 2025-03-30 PROCEDURE — 6360000002 HC RX W HCPCS: Performed by: EMERGENCY MEDICINE

## 2025-03-30 PROCEDURE — 99283 EMERGENCY DEPT VISIT LOW MDM: CPT

## 2025-03-30 PROCEDURE — 6370000000 HC RX 637 (ALT 250 FOR IP): Performed by: EMERGENCY MEDICINE

## 2025-03-30 PROCEDURE — 2720000011 HC SANE KIT SUPPLY STERILE

## 2025-03-30 RX ORDER — CEFTRIAXONE 1 G/1
500 INJECTION, POWDER, FOR SOLUTION INTRAMUSCULAR; INTRAVENOUS ONCE
Status: DISCONTINUED | OUTPATIENT
Start: 2025-03-30 | End: 2025-03-30

## 2025-03-30 RX ORDER — AZITHROMYCIN 250 MG/1
1000 TABLET, FILM COATED ORAL ONCE
Status: DISCONTINUED | OUTPATIENT
Start: 2025-03-30 | End: 2025-03-30

## 2025-03-30 RX ORDER — ZINC OXIDE 13 %
CREAM (GRAM) TOPICAL 2 TIMES DAILY PRN
Qty: 113 G | Refills: 0 | Status: SHIPPED | OUTPATIENT
Start: 2025-03-30
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
## 2025-03-30 NOTE — ED NOTES
Pt is standing at doorway with bed alarm going off, this RN and ginna RN entered room and attempted to get pt back to bed and patient was very agitated at this time. Even when this rn attempted to help pt get back into bed, pt got very upset this RN touch arm and legs to help pt return to bed. Patient given warm blanket and call light with tv show on to distract pt from attempting to exit bed again.